# Patient Record
Sex: FEMALE | Race: WHITE | Employment: OTHER | URBAN - METROPOLITAN AREA
[De-identification: names, ages, dates, MRNs, and addresses within clinical notes are randomized per-mention and may not be internally consistent; named-entity substitution may affect disease eponyms.]

---

## 2024-04-28 ENCOUNTER — APPOINTMENT (OUTPATIENT)
Dept: CT IMAGING | Age: 74
DRG: 068 | End: 2024-04-28
Payer: MEDICARE

## 2024-04-28 ENCOUNTER — HOSPITAL ENCOUNTER (INPATIENT)
Age: 74
LOS: 3 days | Discharge: NURSING, REHAB OR LONG-TERM CARE | DRG: 068 | End: 2024-05-01
Attending: EMERGENCY MEDICINE | Admitting: STUDENT IN AN ORGANIZED HEALTH CARE EDUCATION/TRAINING PROGRAM
Payer: MEDICARE

## 2024-04-28 ENCOUNTER — APPOINTMENT (OUTPATIENT)
Dept: GENERAL RADIOLOGY | Age: 74
DRG: 068 | End: 2024-04-28
Payer: MEDICARE

## 2024-04-28 DIAGNOSIS — I63.9 CEREBROVASCULAR ACCIDENT (CVA), UNSPECIFIED MECHANISM (HCC): ICD-10-CM

## 2024-04-28 DIAGNOSIS — R29.90 STROKE-LIKE EPISODE: Primary | ICD-10-CM

## 2024-04-28 PROBLEM — I65.03 STENOSIS OF BOTH VERTEBRAL ARTERIES: Status: ACTIVE | Noted: 2024-04-28

## 2024-04-28 PROBLEM — R26.9 GAIT ABNORMALITY: Status: ACTIVE | Noted: 2024-04-28

## 2024-04-28 PROBLEM — I10 PRIMARY HYPERTENSION: Status: ACTIVE | Noted: 2024-04-28

## 2024-04-28 LAB
ANION GAP SERPL CALCULATED.3IONS-SCNC: 14 MMOL/L (ref 9–17)
BASOPHILS # BLD: 0.1 K/UL (ref 0–0.2)
BASOPHILS NFR BLD: 1 % (ref 0–2)
BUN SERPL-MCNC: 18 MG/DL (ref 8–23)
CALCIUM SERPL-MCNC: 9.5 MG/DL (ref 8.6–10.4)
CHLORIDE SERPL-SCNC: 107 MMOL/L (ref 98–107)
CO2 SERPL-SCNC: 21 MMOL/L (ref 20–31)
CREAT SERPL-MCNC: 0.9 MG/DL (ref 0.5–0.9)
EOSINOPHIL # BLD: 0.1 K/UL (ref 0–0.4)
EOSINOPHILS RELATIVE PERCENT: 1 % (ref 1–4)
ERYTHROCYTE [DISTWIDTH] IN BLOOD BY AUTOMATED COUNT: 13.4 % (ref 12.5–15.4)
GFR SERPL CREATININE-BSD FRML MDRD: 68 ML/MIN/1.73M2
GLUCOSE SERPL-MCNC: 103 MG/DL (ref 70–99)
HCT VFR BLD AUTO: 50 % (ref 36–46)
HGB BLD-MCNC: 17.1 G/DL (ref 12–16)
INR PPP: 1
LYMPHOCYTES NFR BLD: 1.3 K/UL (ref 1–4.8)
LYMPHOCYTES RELATIVE PERCENT: 17 % (ref 24–44)
MAGNESIUM SERPL-MCNC: 2.3 MG/DL (ref 1.6–2.6)
MCH RBC QN AUTO: 32 PG (ref 26–34)
MCHC RBC AUTO-ENTMCNC: 34.2 G/DL (ref 31–37)
MCV RBC AUTO: 93.6 FL (ref 80–100)
MONOCYTES NFR BLD: 0.7 K/UL (ref 0.1–1.2)
MONOCYTES NFR BLD: 10 % (ref 2–11)
NEUTROPHILS NFR BLD: 71 % (ref 36–66)
NEUTS SEG NFR BLD: 5.2 K/UL (ref 1.8–7.7)
PARTIAL THROMBOPLASTIN TIME: 24.3 SEC (ref 21.3–31.3)
PLATELET # BLD AUTO: 89 K/UL (ref 140–450)
PMV BLD AUTO: 7.1 FL (ref 6–12)
POTASSIUM SERPL-SCNC: 3.8 MMOL/L (ref 3.7–5.3)
PROTHROMBIN TIME: 11 SEC (ref 9.4–12.6)
RBC # BLD AUTO: 5.34 M/UL (ref 4–5.2)
SODIUM SERPL-SCNC: 142 MMOL/L (ref 135–144)
TROPONIN I SERPL HS-MCNC: 15 NG/L (ref 0–14)
TROPONIN I SERPL HS-MCNC: 16 NG/L (ref 0–14)
WBC OTHER # BLD: 7.3 K/UL (ref 3.5–11)

## 2024-04-28 PROCEDURE — 6370000000 HC RX 637 (ALT 250 FOR IP): Performed by: STUDENT IN AN ORGANIZED HEALTH CARE EDUCATION/TRAINING PROGRAM

## 2024-04-28 PROCEDURE — 99222 1ST HOSP IP/OBS MODERATE 55: CPT | Performed by: STUDENT IN AN ORGANIZED HEALTH CARE EDUCATION/TRAINING PROGRAM

## 2024-04-28 PROCEDURE — 6360000004 HC RX CONTRAST MEDICATION: Performed by: EMERGENCY MEDICINE

## 2024-04-28 PROCEDURE — 93005 ELECTROCARDIOGRAM TRACING: CPT | Performed by: STUDENT IN AN ORGANIZED HEALTH CARE EDUCATION/TRAINING PROGRAM

## 2024-04-28 PROCEDURE — 85730 THROMBOPLASTIN TIME PARTIAL: CPT

## 2024-04-28 PROCEDURE — 99284 EMERGENCY DEPT VISIT MOD MDM: CPT | Performed by: PSYCHIATRY & NEUROLOGY

## 2024-04-28 PROCEDURE — 36415 COLL VENOUS BLD VENIPUNCTURE: CPT

## 2024-04-28 PROCEDURE — 83735 ASSAY OF MAGNESIUM: CPT

## 2024-04-28 PROCEDURE — 83036 HEMOGLOBIN GLYCOSYLATED A1C: CPT

## 2024-04-28 PROCEDURE — 71045 X-RAY EXAM CHEST 1 VIEW: CPT

## 2024-04-28 PROCEDURE — 2580000003 HC RX 258: Performed by: EMERGENCY MEDICINE

## 2024-04-28 PROCEDURE — 85025 COMPLETE CBC W/AUTO DIFF WBC: CPT

## 2024-04-28 PROCEDURE — 70498 CT ANGIOGRAPHY NECK: CPT

## 2024-04-28 PROCEDURE — 6370000000 HC RX 637 (ALT 250 FOR IP): Performed by: EMERGENCY MEDICINE

## 2024-04-28 PROCEDURE — 84484 ASSAY OF TROPONIN QUANT: CPT

## 2024-04-28 PROCEDURE — 93005 ELECTROCARDIOGRAM TRACING: CPT | Performed by: EMERGENCY MEDICINE

## 2024-04-28 PROCEDURE — 80048 BASIC METABOLIC PNL TOTAL CA: CPT

## 2024-04-28 PROCEDURE — 70450 CT HEAD/BRAIN W/O DYE: CPT

## 2024-04-28 PROCEDURE — 85610 PROTHROMBIN TIME: CPT

## 2024-04-28 PROCEDURE — 99285 EMERGENCY DEPT VISIT HI MDM: CPT

## 2024-04-28 PROCEDURE — 4A03X5D MEASUREMENT OF ARTERIAL FLOW, INTRACRANIAL, EXTERNAL APPROACH: ICD-10-PCS | Performed by: RADIOLOGY

## 2024-04-28 PROCEDURE — 2060000000 HC ICU INTERMEDIATE R&B

## 2024-04-28 RX ORDER — CLOPIDOGREL 300 MG/1
300 TABLET, FILM COATED ORAL ONCE
Status: COMPLETED | OUTPATIENT
Start: 2024-04-28 | End: 2024-04-28

## 2024-04-28 RX ORDER — CLOPIDOGREL BISULFATE 75 MG/1
75 TABLET ORAL DAILY
Status: DISCONTINUED | OUTPATIENT
Start: 2024-04-29 | End: 2024-05-01 | Stop reason: HOSPADM

## 2024-04-28 RX ORDER — ONDANSETRON 4 MG/1
4 TABLET, ORALLY DISINTEGRATING ORAL EVERY 8 HOURS PRN
Status: DISCONTINUED | OUTPATIENT
Start: 2024-04-28 | End: 2024-05-01 | Stop reason: HOSPADM

## 2024-04-28 RX ORDER — LOSARTAN POTASSIUM 25 MG/1
25 TABLET ORAL DAILY
Status: DISCONTINUED | OUTPATIENT
Start: 2024-04-29 | End: 2024-05-01 | Stop reason: HOSPADM

## 2024-04-28 RX ORDER — POLYETHYLENE GLYCOL 3350 17 G/17G
17 POWDER, FOR SOLUTION ORAL DAILY PRN
Status: DISCONTINUED | OUTPATIENT
Start: 2024-04-28 | End: 2024-05-01 | Stop reason: HOSPADM

## 2024-04-28 RX ORDER — ACETAMINOPHEN 650 MG/1
650 SUPPOSITORY RECTAL EVERY 6 HOURS PRN
Status: DISCONTINUED | OUTPATIENT
Start: 2024-04-28 | End: 2024-05-01 | Stop reason: HOSPADM

## 2024-04-28 RX ORDER — POTASSIUM CHLORIDE 7.45 MG/ML
10 INJECTION INTRAVENOUS PRN
Status: DISCONTINUED | OUTPATIENT
Start: 2024-04-28 | End: 2024-05-01 | Stop reason: HOSPADM

## 2024-04-28 RX ORDER — CLOPIDOGREL BISULFATE 75 MG/1
75 TABLET ORAL ONCE
Status: DISCONTINUED | OUTPATIENT
Start: 2024-04-29 | End: 2024-04-29

## 2024-04-28 RX ORDER — ASPIRIN 81 MG/1
81 TABLET, CHEWABLE ORAL DAILY
Status: DISCONTINUED | OUTPATIENT
Start: 2024-04-29 | End: 2024-05-01 | Stop reason: HOSPADM

## 2024-04-28 RX ORDER — ATORVASTATIN CALCIUM 40 MG/1
40 TABLET, FILM COATED ORAL NIGHTLY
Status: DISCONTINUED | OUTPATIENT
Start: 2024-04-28 | End: 2024-05-01 | Stop reason: HOSPADM

## 2024-04-28 RX ORDER — MAGNESIUM SULFATE IN WATER 40 MG/ML
2000 INJECTION, SOLUTION INTRAVENOUS PRN
Status: DISCONTINUED | OUTPATIENT
Start: 2024-04-28 | End: 2024-05-01 | Stop reason: HOSPADM

## 2024-04-28 RX ORDER — SODIUM CHLORIDE 9 MG/ML
INJECTION, SOLUTION INTRAVENOUS PRN
Status: DISCONTINUED | OUTPATIENT
Start: 2024-04-28 | End: 2024-05-01 | Stop reason: HOSPADM

## 2024-04-28 RX ORDER — SODIUM CHLORIDE 0.9 % (FLUSH) 0.9 %
5-40 SYRINGE (ML) INJECTION EVERY 12 HOURS SCHEDULED
Status: DISCONTINUED | OUTPATIENT
Start: 2024-04-28 | End: 2024-05-01 | Stop reason: HOSPADM

## 2024-04-28 RX ORDER — ASPIRIN 81 MG/1
324 TABLET, CHEWABLE ORAL ONCE
Status: COMPLETED | OUTPATIENT
Start: 2024-04-28 | End: 2024-04-28

## 2024-04-28 RX ORDER — ACETAMINOPHEN 325 MG/1
650 TABLET ORAL EVERY 6 HOURS PRN
Status: DISCONTINUED | OUTPATIENT
Start: 2024-04-28 | End: 2024-05-01 | Stop reason: HOSPADM

## 2024-04-28 RX ORDER — 0.9 % SODIUM CHLORIDE 0.9 %
80 INTRAVENOUS SOLUTION INTRAVENOUS ONCE
Status: DISCONTINUED | OUTPATIENT
Start: 2024-04-28 | End: 2024-05-01 | Stop reason: HOSPADM

## 2024-04-28 RX ORDER — SODIUM CHLORIDE 0.9 % (FLUSH) 0.9 %
5-40 SYRINGE (ML) INJECTION PRN
Status: DISCONTINUED | OUTPATIENT
Start: 2024-04-28 | End: 2024-05-01 | Stop reason: HOSPADM

## 2024-04-28 RX ORDER — ENOXAPARIN SODIUM 100 MG/ML
40 INJECTION SUBCUTANEOUS DAILY
Status: DISCONTINUED | OUTPATIENT
Start: 2024-04-29 | End: 2024-05-01 | Stop reason: HOSPADM

## 2024-04-28 RX ORDER — ONDANSETRON 2 MG/ML
4 INJECTION INTRAMUSCULAR; INTRAVENOUS EVERY 6 HOURS PRN
Status: DISCONTINUED | OUTPATIENT
Start: 2024-04-28 | End: 2024-05-01 | Stop reason: HOSPADM

## 2024-04-28 RX ORDER — POTASSIUM CHLORIDE 20 MEQ/1
40 TABLET, EXTENDED RELEASE ORAL PRN
Status: DISCONTINUED | OUTPATIENT
Start: 2024-04-28 | End: 2024-05-01 | Stop reason: HOSPADM

## 2024-04-28 RX ADMIN — ASPIRIN 324 MG: 81 TABLET, CHEWABLE ORAL at 15:03

## 2024-04-28 RX ADMIN — SODIUM CHLORIDE, PRESERVATIVE FREE 10 ML: 5 INJECTION INTRAVENOUS at 13:14

## 2024-04-28 RX ADMIN — IOPAMIDOL 75 ML: 755 INJECTION, SOLUTION INTRAVENOUS at 13:12

## 2024-04-28 RX ADMIN — Medication 80 ML: at 13:15

## 2024-04-28 RX ADMIN — CLOPIDOGREL BISULFATE 300 MG: 300 TABLET, FILM COATED ORAL at 15:02

## 2024-04-28 RX ADMIN — ATORVASTATIN CALCIUM 40 MG: 40 TABLET, FILM COATED ORAL at 21:45

## 2024-04-28 ASSESSMENT — PAIN - FUNCTIONAL ASSESSMENT
PAIN_FUNCTIONAL_ASSESSMENT: NONE - DENIES PAIN
PAIN_FUNCTIONAL_ASSESSMENT: 0-10

## 2024-04-28 ASSESSMENT — PAIN SCALES - GENERAL: PAINLEVEL_OUTOF10: 0

## 2024-04-28 NOTE — H&P
appearing. CT and CTA of the head and neck showed several old strokes as well as vertebral artery stenosis but were negative for acute CVA. The patient is admitted to internal medicine for further management of likely subacute CVA. Neurology has been consulted; appreciate recommendations.     Past Medical History:     Past Medical History:   Diagnosis Date    CAD (coronary artery disease)     Hypertension         Past Surgical History:     Past Surgical History:   Procedure Laterality Date    CORONARY STENT PLACEMENT      PACEMAKER PLACEMENT          Medications Prior to Admission:     Prior to Admission medications    Not on File        Allergies:     Patient has no known allergies.    Physical Exam:   BP (!) 157/99   Pulse 75   Temp 97.7 °F (36.5 °C) (Oral)   Resp 18   Wt 86.7 kg (191 lb 3.2 oz)   Temp (24hrs), Av.7 °F (36.5 °C), Min:97.7 °F (36.5 °C), Max:97.7 °F (36.5 °C)    No results for input(s): \"POCGLU\" in the last 72 hours.  No intake or output data in the 24 hours ending 24 1643    General Appearance:  alert, well appearing, and in no acute distress  Mental status: oriented to person, place, and time  Head:  normocephalic, atraumatic  Eye: no icterus, redness, pupils equal and reactive, extraocular eye movements intact, conjunctiva clear  Ear: normal external ear, no discharge, hearing intact  Nose:  no drainage noted  Mouth: mucous membranes moist  Neck: supple, no carotid bruits, thyroid not palpable  Lungs: Bilateral equal air entry, clear to ausculation, no wheezing, rales or rhonchi, normal effort  Cardiovascular: normal rate, regular rhythm, no murmur, gallop, rub  Abdomen: Soft, nontender, nondistended, normal bowel sounds, no hepatomegaly or splenomegaly  Neurologic: Speech is slurred. No cranial nerve deficits appreciated.   Skin: No gross lesions, rashes, bruising or bleeding on exposed skin area  Extremities:  peripheral pulses palpable, no pedal edema or calf pain with     P Axis 23 degrees    R Axis -5 degrees    T Axis 141 degrees   Troponin    Collection Time: 04/28/24  3:58 PM   Result Value Ref Range    Troponin, High Sensitivity 15 (H) 0 - 14 ng/L       Imaging/Diagnostics:  CT HEAD WO CONTRAST    Result Date: 4/28/2024  1. No acute intracranial abnormality. 2. Small old infarctions in the left cerebellar hemisphere and in the right occipital lobe.  Old lacunar infarct in the damian. 3. Right dominance of the vertebral arteries with hypoplastic left vertebral artery.  Superimposed occlusion in the V1 and V2 segments of the left vertebral artery. 4. 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 5. Moderate stenosis in the proximal P2 segment of the left PCA. 6. Moderate to severe stenosis in the supraclinoid segment of the right internal carotid artery. 7. 70% stenosis in the cavernous/supraclinoid segment of the left internal carotid artery. 8. 50% stenosis at the origin of the left common carotid artery. 9. Up to 40% stenosis in the proximal bilateral internal carotid arteries.     CTA HEAD NECK W CONTRAST    Result Date: 4/28/2024  1. No acute intracranial abnormality. 2. Small old infarctions in the left cerebellar hemisphere and in the right occipital lobe.  Old lacunar infarct in the damian. 3. Right dominance of the vertebral arteries with hypoplastic left vertebral artery.  Superimposed occlusion in the V1 and V2 segments of the left vertebral artery. 4. 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 5. Moderate stenosis in the proximal P2 segment of the left PCA. 6. Moderate to severe stenosis in the supraclinoid segment of the right internal carotid artery. 7. 70% stenosis in the cavernous/supraclinoid segment of the left internal carotid artery. 8. 50% stenosis at the origin of the left common carotid artery. 9. Up to 40% stenosis in the proximal bilateral internal carotid arteries.     XR CHEST PORTABLE    Result Date: 4/28/2024  No radiographic evidence of an acute

## 2024-04-28 NOTE — ED NOTES
Ortiz Watson MD   Patient:  JOSEPH, GRACIA   YOB: 1950  MRN:  [id number]  Location: [facility]    [id number]  4/28/24 12:33 PM   [phone] From:  ed [facility] Emergency Department [place] RE: GRACIA JOSEPH per dr Miguel Ángel Dan

## 2024-04-28 NOTE — ED PROVIDER NOTES
[facility] Emergency Department  [address]  [place] [address]  Phone: [phone]  Fax: [phone]        Pt Name: Samantha Calero  MRN: [id number]  Birthdate 1950  Date of evaluation: 4/28/24      CHIEF COMPLAINT   No chief complaint on file.        HISTORY OF PRESENT ILLNESS    Samantha Calero is a 73 y.o. female who presents to our Emergency Department.    Patient presents emerged primary complaining of strokelike symptoms.  Patient states that she has had difficulty with slurred speech and difficulty with ambulation ongoing for the past 4 to 5 days.  States that it did not get better so today she decided to call EMS.  Patient states that she did not take any of her medications that she is prescribed and has not done so in a few years.  Patient states that she has some blurriness in her vision but no visual field deficits.  Patient states that she does not have any chest pain or shortness of breath.  No nausea no vomiting.  No diarrhea no constipation.  She states that she feels like she feels unstable.  No diplopia or dysphagia.          REVIEW OF SYSTEMS       Review of Systems   Constitutional:  Negative for chills, diaphoresis and fever.   HENT:  Negative for drooling.    Eyes:  Positive for visual disturbance. Negative for redness.   Respiratory:  Negative for cough, chest tightness and shortness of breath.    Cardiovascular:  Negative for chest pain and palpitations.   Gastrointestinal:  Negative for abdominal pain, constipation, diarrhea, nausea and vomiting.   Genitourinary:  Negative for dysuria and hematuria.   Musculoskeletal:  Negative for neck stiffness.   Skin:  Negative for rash.   Neurological:  Positive for dizziness and speech difficulty. Negative for weakness, numbness and headaches.   Psychiatric/Behavioral:  Negative for agitation.        PAST MEDICAL HISTORY     Past Medical History:   Diagnosis Date    CAD (coronary artery disease)        Comments: Muscle strength 5/5 bilaterally:  Strength, Elbow flexion/extension, shoulder flexion, hip flexion/extension, knee flexion/extension, ankle dorsi/plantarflexion Finger to nose and heel to shin intact bilaterally     Skin:     Capillary Refill: Capillary refill takes less than 2 seconds.   Neurological:      Mental Status: She is alert and oriented to person, place, and time.      Sensory: No sensory deficit.   Psychiatric:         Mood and Affect: Mood normal.         Behavior: Behavior normal.         DIAGNOSTIC RESULTS     EKG: All EKG's are interpreted by the Emergency Department Physician who either signs or Co-signs this chart in the absence of a cardiologist.  Please see dictation of EKG in ED Course    RADIOLOGY:  The following imaging tests were ordered, reviewed by me, and interpreted by Radiology    CT HEAD WO CONTRAST    Result Date: 4/28/2024  EXAMINATION: CTA OF THE HEAD AND NECK WITH CONTRAST; CT OF THE HEAD WITHOUT CONTRAST 4/28/2024 1:11 pm; 4/28/2024 1:16 pm: TECHNIQUE: CTA of the head and neck was performed with the administration of intravenous contrast. Multiplanar reformatted images are provided for review.  MIP images are provided for review. Stenosis of the internal carotid arteries measured using NASCET criteria. Automated exposure control, iterative reconstruction, and/or weight based adjustment of the mA/kV was utilized to reduce the radiation dose to as low as reasonably achievable.; CT of the head was performed without the administration of intravenous contrast. Automated exposure control, iterative reconstruction, and/or weight based adjustment of the mA/kV was utilized to reduce the radiation dose to as low as reasonably achievable. Noncontrast CT of the head with reconstructed 2-D images are also provided for review. COMPARISON: None. HISTORY: ORDERING SYSTEM PROVIDED HISTORY: possible stroke TECHNOLOGIST PROVIDED HISTORY: possible stroke Decision Support Exception -

## 2024-04-28 NOTE — VIRTUAL HEALTH
[facility] Stroke and Telestroke Consult for  [place] ED Stroke Alert through [employer] @ 14:45  4/28/2024 3:33 PM    Pt Name: Samantha Calero  MRN: [id number]  YOB: 1950  Date of evaluation: 4/28/2024  Primary Care Physician: Stefanie Rollins MD  Reason for Evaluation: Stroke Evaluation with Discussion with Ed or primary team with Telemedicine and stroke evaluation with Review of imaging and labs    Samantha Calero is a 73 y.o. female who presents with history of cad, pacemaker and htn with symptoms for the past 5 days for dysarthria and gait imbalance.  No improvement over the past five days. Not on antiplatelets for the past year. Out of window for iv tnk    Ct and cta concerning for chronic left cerebellar and right occipital and pontine stroke.  bilateral functionally fetal pca and noted diminutive basilar artery with stenosis.  Also noted right distal ica stenosis and left cavernous 70% stenosis.      LKW: 5 days  NIH:  3    Allergies  has No Known Allergies.  Medications  Prior to Admission medications    Not on File    Scheduled Meds:   sodium chloride  80 mL IntraVENous Once    [START ON 4/29/2024] clopidogrel  75 mg Oral Once     Continuous Infusions:  PRN Meds:.sodium chloride flush  Past Medical History   has a past medical history of CAD (coronary artery disease) and Hypertension.  Social History  Social History     Socioeconomic History    Marital status: Single     Spouse name: Not on file    Number of children: Not on file    Years of education: Not on file    Highest education level: Not on file   Occupational History    Not on file   Tobacco Use    Smoking status: Every Day     Types: Cigarettes    Smokeless tobacco: Never   Substance and Sexual Activity    Alcohol use: Not Currently    Drug use: Never    Sexual activity: Not on file   Other Topics Concern    Not on file   Social History Narrative    Not on file     Social Determinants of Health     Financial

## 2024-04-28 NOTE — ED TRIAGE NOTES
Chief Complaint   Patient presents with     RECHECK     hospital follow up       There were no vitals filed for this visit.    There is no height or weight on file to calculate BMI.          JUSTIN MORE EMT     PT reports unsteady gait that started approx 4-5 days ago.  Called EMS today & noticed her speech was slurred

## 2024-04-29 ENCOUNTER — APPOINTMENT (OUTPATIENT)
Age: 74
DRG: 068 | End: 2024-04-29
Attending: STUDENT IN AN ORGANIZED HEALTH CARE EDUCATION/TRAINING PROGRAM
Payer: MEDICARE

## 2024-04-29 LAB
ANION GAP SERPL CALCULATED.3IONS-SCNC: 14 MMOL/L (ref 9–17)
BASOPHILS # BLD: 0.1 K/UL (ref 0–0.2)
BASOPHILS NFR BLD: 1 % (ref 0–2)
BUN SERPL-MCNC: 17 MG/DL (ref 8–23)
CALCIUM SERPL-MCNC: 9.6 MG/DL (ref 8.6–10.4)
CHLORIDE SERPL-SCNC: 104 MMOL/L (ref 98–107)
CO2 SERPL-SCNC: 21 MMOL/L (ref 20–31)
CREAT SERPL-MCNC: 0.8 MG/DL (ref 0.5–0.9)
ECHO AO ROOT DIAM: 3 CM
ECHO AO ROOT INDEX: 1.58 CM/M2
ECHO AV AREA PEAK VELOCITY: 2 CM2
ECHO AV AREA VTI: 2.1 CM2
ECHO AV AREA/BSA PEAK VELOCITY: 1.1 CM2/M2
ECHO AV AREA/BSA VTI: 1.1 CM2/M2
ECHO AV MEAN GRADIENT: 5 MMHG
ECHO AV MEAN VELOCITY: 1 M/S
ECHO AV PEAK GRADIENT: 9 MMHG
ECHO AV PEAK VELOCITY: 1.5 M/S
ECHO AV VELOCITY RATIO: 0.8
ECHO AV VTI: 25 CM
ECHO BSA: 1.95 M2
ECHO EST RA PRESSURE: 3 MMHG
ECHO IVC EXP: 1.9 CM
ECHO LA AREA 2C: 18.3 CM2
ECHO LA AREA 4C: 16.3 CM2
ECHO LA DIAMETER INDEX: 2 CM/M2
ECHO LA DIAMETER: 3.8 CM
ECHO LA MAJOR AXIS: 5.4 CM
ECHO LA MINOR AXIS: 5.5 CM
ECHO LA TO AORTIC ROOT RATIO: 1.27
ECHO LA VOL BP: 45 ML (ref 22–52)
ECHO LA VOL MOD A2C: 50 ML (ref 22–52)
ECHO LA VOL MOD A4C: 40 ML (ref 22–52)
ECHO LA VOL/BSA BIPLANE: 24 ML/M2 (ref 16–34)
ECHO LA VOLUME INDEX MOD A2C: 26 ML/M2 (ref 16–34)
ECHO LA VOLUME INDEX MOD A4C: 21 ML/M2 (ref 16–34)
ECHO LV E' LATERAL VELOCITY: 4 CM/S
ECHO LV E' SEPTAL VELOCITY: 3 CM/S
ECHO LV EDV A2C: 61 ML
ECHO LV EDV A4C: 84 ML
ECHO LV EDV INDEX A4C: 44 ML/M2
ECHO LV EDV NDEX A2C: 32 ML/M2
ECHO LV EJECTION FRACTION A2C: 26 %
ECHO LV EJECTION FRACTION A4C: 49 %
ECHO LV EJECTION FRACTION BIPLANE: 41 % (ref 55–100)
ECHO LV ESV A2C: 45 ML
ECHO LV ESV A4C: 43 ML
ECHO LV ESV INDEX A2C: 24 ML/M2
ECHO LV ESV INDEX A4C: 23 ML/M2
ECHO LV FRACTIONAL SHORTENING: 13 % (ref 28–44)
ECHO LV INTERNAL DIMENSION DIASTOLE INDEX: 2.37 CM/M2
ECHO LV INTERNAL DIMENSION DIASTOLIC: 4.5 CM (ref 3.9–5.3)
ECHO LV INTERNAL DIMENSION SYSTOLIC INDEX: 2.05 CM/M2
ECHO LV INTERNAL DIMENSION SYSTOLIC: 3.9 CM
ECHO LV IVSD: 1.6 CM (ref 0.6–0.9)
ECHO LV MASS 2D: 304.6 G (ref 67–162)
ECHO LV MASS INDEX 2D: 160.3 G/M2 (ref 43–95)
ECHO LV POSTERIOR WALL DIASTOLIC: 1.6 CM (ref 0.6–0.9)
ECHO LV RELATIVE WALL THICKNESS RATIO: 0.71
ECHO LVOT AREA: 2.5 CM2
ECHO LVOT AV VTI INDEX: 0.84
ECHO LVOT DIAM: 1.8 CM
ECHO LVOT MEAN GRADIENT: 3 MMHG
ECHO LVOT PEAK GRADIENT: 6 MMHG
ECHO LVOT PEAK VELOCITY: 1.2 M/S
ECHO LVOT STROKE VOLUME INDEX: 28 ML/M2
ECHO LVOT SV: 53.2 ML
ECHO LVOT VTI: 20.9 CM
ECHO MV A VELOCITY: 1.11 M/S
ECHO MV E DECELERATION TIME (DT): 173 MS
ECHO MV E VELOCITY: 0.56 M/S
ECHO MV E/A RATIO: 0.5
ECHO MV E/E' LATERAL: 14
ECHO MV E/E' RATIO (AVERAGED): 16.33
ECHO RIGHT VENTRICULAR SYSTOLIC PRESSURE (RVSP): 31 MMHG
ECHO RV TAPSE: 1.6 CM (ref 1.7–?)
ECHO TV REGURGITANT MAX VELOCITY: 2.64 M/S
ECHO TV REGURGITANT PEAK GRADIENT: 28 MMHG
EKG ATRIAL RATE: 71 BPM
EKG ATRIAL RATE: 76 BPM
EKG P AXIS: 23 DEGREES
EKG P-R INTERVAL: 178 MS
EKG P-R INTERVAL: 184 MS
EKG Q-T INTERVAL: 420 MS
EKG Q-T INTERVAL: 424 MS
EKG QRS DURATION: 100 MS
EKG QRS DURATION: 94 MS
EKG QTC CALCULATION (BAZETT): 460 MS
EKG QTC CALCULATION (BAZETT): 472 MS
EKG R AXIS: -173 DEGREES
EKG R AXIS: -5 DEGREES
EKG T AXIS: 141 DEGREES
EKG T AXIS: 68 DEGREES
EKG VENTRICULAR RATE: 71 BPM
EKG VENTRICULAR RATE: 76 BPM
EOSINOPHIL # BLD: 0.1 K/UL (ref 0–0.4)
EOSINOPHILS RELATIVE PERCENT: 1 % (ref 1–4)
ERYTHROCYTE [DISTWIDTH] IN BLOOD BY AUTOMATED COUNT: 13.2 % (ref 12.5–15.4)
EST. AVERAGE GLUCOSE BLD GHB EST-MCNC: 114 MG/DL
GFR SERPL CREATININE-BSD FRML MDRD: 79 ML/MIN/1.73M2
GLUCOSE SERPL-MCNC: 80 MG/DL (ref 70–99)
HBA1C MFR BLD: 5.6 % (ref 4–6)
HCT VFR BLD AUTO: 50.4 % (ref 36–46)
HGB BLD-MCNC: 17.3 G/DL (ref 12–16)
LYMPHOCYTES NFR BLD: 0.9 K/UL (ref 1–4.8)
LYMPHOCYTES RELATIVE PERCENT: 12 % (ref 24–44)
MCH RBC QN AUTO: 32.1 PG (ref 26–34)
MCHC RBC AUTO-ENTMCNC: 34.4 G/DL (ref 31–37)
MCV RBC AUTO: 93.4 FL (ref 80–100)
MONOCYTES NFR BLD: 0.7 K/UL (ref 0.1–1.2)
MONOCYTES NFR BLD: 9 % (ref 2–11)
NEUTROPHILS NFR BLD: 77 % (ref 36–66)
NEUTS SEG NFR BLD: 5.7 K/UL (ref 1.8–7.7)
PLATELET # BLD AUTO: 75 K/UL (ref 140–450)
PMV BLD AUTO: 7.4 FL (ref 6–12)
POTASSIUM SERPL-SCNC: 3.6 MMOL/L (ref 3.7–5.3)
RBC # BLD AUTO: 5.4 M/UL (ref 4–5.2)
SODIUM SERPL-SCNC: 139 MMOL/L (ref 135–144)
WBC OTHER # BLD: 7.3 K/UL (ref 3.5–11)

## 2024-04-29 PROCEDURE — 6360000002 HC RX W HCPCS: Performed by: STUDENT IN AN ORGANIZED HEALTH CARE EDUCATION/TRAINING PROGRAM

## 2024-04-29 PROCEDURE — 99223 1ST HOSP IP/OBS HIGH 75: CPT | Performed by: INTERNAL MEDICINE

## 2024-04-29 PROCEDURE — 99222 1ST HOSP IP/OBS MODERATE 55: CPT | Performed by: PSYCHIATRY & NEUROLOGY

## 2024-04-29 PROCEDURE — 97162 PT EVAL MOD COMPLEX 30 MIN: CPT

## 2024-04-29 PROCEDURE — 2060000000 HC ICU INTERMEDIATE R&B

## 2024-04-29 PROCEDURE — 97166 OT EVAL MOD COMPLEX 45 MIN: CPT

## 2024-04-29 PROCEDURE — 97535 SELF CARE MNGMENT TRAINING: CPT

## 2024-04-29 PROCEDURE — 36415 COLL VENOUS BLD VENIPUNCTURE: CPT

## 2024-04-29 PROCEDURE — 6370000000 HC RX 637 (ALT 250 FOR IP): Performed by: STUDENT IN AN ORGANIZED HEALTH CARE EDUCATION/TRAINING PROGRAM

## 2024-04-29 PROCEDURE — 80048 BASIC METABOLIC PNL TOTAL CA: CPT

## 2024-04-29 PROCEDURE — 99232 SBSQ HOSP IP/OBS MODERATE 35: CPT | Performed by: STUDENT IN AN ORGANIZED HEALTH CARE EDUCATION/TRAINING PROGRAM

## 2024-04-29 PROCEDURE — 93306 TTE W/DOPPLER COMPLETE: CPT | Performed by: INTERNAL MEDICINE

## 2024-04-29 PROCEDURE — 85025 COMPLETE CBC W/AUTO DIFF WBC: CPT

## 2024-04-29 PROCEDURE — 2580000003 HC RX 258: Performed by: STUDENT IN AN ORGANIZED HEALTH CARE EDUCATION/TRAINING PROGRAM

## 2024-04-29 PROCEDURE — 93306 TTE W/DOPPLER COMPLETE: CPT

## 2024-04-29 PROCEDURE — 97116 GAIT TRAINING THERAPY: CPT

## 2024-04-29 RX ADMIN — ATORVASTATIN CALCIUM 40 MG: 40 TABLET, FILM COATED ORAL at 21:01

## 2024-04-29 RX ADMIN — LOSARTAN POTASSIUM 25 MG: 25 TABLET, FILM COATED ORAL at 09:02

## 2024-04-29 RX ADMIN — SODIUM CHLORIDE, PRESERVATIVE FREE 10 ML: 5 INJECTION INTRAVENOUS at 09:03

## 2024-04-29 RX ADMIN — ASPIRIN 81 MG: 81 TABLET, CHEWABLE ORAL at 09:02

## 2024-04-29 RX ADMIN — ENOXAPARIN SODIUM 40 MG: 100 INJECTION SUBCUTANEOUS at 09:02

## 2024-04-29 RX ADMIN — SODIUM CHLORIDE, PRESERVATIVE FREE 10 ML: 5 INJECTION INTRAVENOUS at 21:01

## 2024-04-29 RX ADMIN — CLOPIDOGREL BISULFATE 75 MG: 75 TABLET ORAL at 09:02

## 2024-04-29 NOTE — PROGRESS NOTES
Physical Therapy  Facility/Department: [address]  Physical Therapy Initial Assessment    Name: Samantha Calero  : 1950  MRN: [id number]  Date of Service: 2024    Per internal medicine:   \"Samantha Calero is a 73 y.o. female with a past medical history of CAD and HTN who presented to the emergency department on 2024 complaining of slurred speech, left-sided facial numbness and gait disturbance with falls. The patient states that her symptoms began five-days-ago and have remained about the same. The patient reports that the left side of her face in numb and says that she has been falling to the right. In the ED, the patient was afebrile and nontoxic appearing. CT and CTA of the head and neck showed several old strokes as well as vertebral artery stenosis but were negative for acute CVA. The patient is admitted to internal medicine for further management of likely subacute CVA. Neurology has been consulted; appreciate recommendations.\"       Discharge Recommendations:  Patient would benefit from continued therapy after discharge, Patient able to tolerate 3hrs of therapy a day   Further therapy recommended at discharge.The patient should be able to tolerate at least 3 hours of therapy per day over 5 days or 15 hours over 7 days.   This patient may benefit from a Physical Medicine and Rehab consult.     PT Equipment Recommendations  Equipment Needed: Yes  Mobility Devices: Walker  Walker: Rolling  Other: Continue to assess pending progress      Patient Diagnosis(es): The encounter diagnosis was Stroke-like episode.  Past Medical History:  has a past medical history of CAD (coronary artery disease) and Hypertension.  Past Surgical History:  has a past surgical history that includes Coronary stent placement and pacemaker placement.    Assessment   Body Structures, Functions, Activity Limitations Requiring Skilled Therapeutic Intervention: Decreased functional mobility ;Decreased strength;Decreased safe

## 2024-04-29 NOTE — CONSULTS
unanticipated weight loss. There's been No change in energy level, No change in activity level.     Eyes: No visual changes or diplopia. No scleral icterus.  ENT: No Headaches, hearing loss or vertigo. No mouth sores or sore throat.  Cardiovascular: As HPI  Respiratory: As HPI  Gastrointestinal: No abdominal pain, appetite loss, blood in stools. No change in bowel or bladder habits.  Genitourinary: No dysuria, trouble voiding, or hematuria.  Musculoskeletal:  No gait disturbance, No weakness or joint complaints.  Integumentary: No rash or pruritis.  Neurological: No headache, diplopia, change in muscle strength, numbness or tingling. No change in gait, balance, coordination, mood, affect, memory, mentation, behavior.  Psychiatric: No anxiety, or depression.  Endocrine: No temperature intolerance. No excessive thirst, fluid intake, or urination. No tremor.  Hematologic/Lymphatic: No abnormal bruising or bleeding, blood clots or swollen lymph nodes.  Allergic/Immunologic: No nasal congestion or hives.    PHYSICAL EXAM:    Physical Examination:    BP (!) 145/71   Pulse 87   Temp 98.1 °F (36.7 °C) (Oral)   Resp 16   Ht 1.626 m (5' 4\")   Wt 84.4 kg (186 lb 1.1 oz)   SpO2 94%   BMI 31.94 kg/m²    Constitutional and General Appearance: alert, cooperative, no distress and appears stated age  HEENT: PERRL, no cervical lymphadenopathy. No masses palpable. Normal oral mucosa  Respiratory:  Normal excursion and expansion without use of accessory muscles  Resp Auscultation: Good respiratory effort. No for increased work of breathing. On auscultation: clear  Cardiovascular:  Heart tones are crisp and normal. regular S1 and S2. Murmurs: none  Jugular venous pulsation Normal  Abdomen:  No masses or tenderness  Bowel sounds present  Extremities:   No Cyanosis or Clubbing   Lower extremity edema: none   Skin: Warm and dry  Neurological:  Alert and oriented.  Moves all extremities well  No abnormalities of mood, affect, memory,  mentation, or behavior are noted    DATA:    Diagnostics:      EKG:   Results for orders placed or performed during the hospital encounter of 04/28/24   EKG 12 Lead   Result Value Ref Range    Ventricular Rate 76 BPM    Atrial Rate 76 BPM    P-R Interval 184 ms    QRS Duration 100 ms    Q-T Interval 420 ms    QTc Calculation (Bazett) 472 ms    P Axis 23 degrees    R Axis -5 degrees    T Axis 141 degrees    Narrative    Sinus rhythm with Premature supraventricular complexes  Moderate voltage criteria for LVH, may be normal variant ( R in aVL , Glidden product )  Inferior infarct (cited on or before 28-APR-2024)  ST & T non specific   Abnormal ECG       Labs:     CBC:   Recent Labs     04/28/24  1222   WBC 7.3   HGB 17.1*   HCT 50.0*   PLT 89*     BMP:   Recent Labs     04/28/24  1222      K 3.8   CO2 21   BUN 18   CREATININE 0.9   LABGLOM 68   GLUCOSE 103*     BNP: No results for input(s): \"BNP\" in the last 72 hours.  PT/INR:   Recent Labs     04/28/24  1222   PROTIME 11.0   INR 1.0     APTT:  Recent Labs     04/28/24  1222   APTT 24.3     CARDIAC ENZYMES:  Recent Labs     04/28/24  1222 04/28/24  1558   TROPHS 16* 15*     FASTING LIPID PANEL:No results found for: \"HDL\", \"LDLDIRECT\", \"LDLCALC\", \"TRIG\"  LIVER PROFILE:No results for input(s): \"AST\", \"ALT\", \"LABALBU\" in the last 72 hours.      IMPRESSION:    Stroke like symptoms   Small old infarct in L Cerebellar/R occipital and old lacunar infarction, severe stenosis in mid basilar artery   Known CAD s/p PCI in past- seen by [facility]- details unclear  Ch Sys HF, HFrEF, s/p ICD (appears dual chamber on CXR)  Renal artery stenosis  HTN- bp better  DL    RECOMMENDATIONS:  AICD interrogation   Echo pending   On DAPT  Neuro eval planned  ? Need for JAYE :  ? Cardioembolic CVAs given CT findings. No need for loop given DC AICD in place  Will follow    Discussed with patient and nursing.    Thank you for allowing me to participate in the care of this patient,

## 2024-04-29 NOTE — CARE COORDINATION
Case Management Assessment  Initial Evaluation    Date/Time of Evaluation: 4/29/2024 10:58 AM  Assessment Completed by: Carisa Figueredo    If patient is discharged prior to next notation, then this note serves as note for discharge by case management.    Patient Name: Samantha Calero                   YOB: 1950  Diagnosis: Stroke-like episode [R29.90]                   Date / Time: 4/28/2024 12:18 PM    Patient Admission Status: Inpatient   Readmission Risk (Low < 19, Mod (19-27), High > 27): Readmission Risk Score: 16.7    Current PCP: Stefanie Rollins MD  PCP verified by CM? Yes    Chart Reviewed: Yes      History Provided by: Patient  Patient Orientation: Alert and Oriented    Patient Cognition: Alert    Hospitalization in the last 30 days (Readmission):  No    If yes, Readmission Assessment in CM Navigator will be completed.    Advance Directives:      Code Status: DNR-CCA   Patient's Primary Decision Maker is: Legal Next of Kin (brother)      Discharge Planning:    Patient lives with: Alone Type of Home: House  Primary Care Giver: Self  Patient Support Systems include: Family Members, Friends/Neighbors (brother)   Current Financial resources: Medicare  Current community resources: None  Current services prior to admission: None            Current DME:              Type of Home Care services:  None    ADLS  Prior functional level: Independent in ADLs/IADLs  Current functional level: Independent in ADLs/IADLs    PT AM-PAC: 12 /24  OT AM-PAC:   /24    Family can provide assistance at DC: No  Would you like Case Management to discuss the discharge plan with any other family members/significant others, and if so, who? Yes (brother)  Plans to Return to Present Housing: No  Other Identified Issues/Barriers to RETURNING to current housing: unsteady on feet  Potential Assistance needed at discharge:  (TBD, walker?)            Potential DME:    Patient expects to discharge to: Unknown (TBD)  Plan for  to utilize the QR Code to obtain additional detailed star ratings from www.Medicare.gov     offered to print and provide the detailed list:    []Accepted   [x]Declined

## 2024-04-29 NOTE — PROGRESS NOTES
[facility]  Office: [phone]  Antoine Powell DO, Herman Mujica DO, Alex Thompson DO, Trae Esposito DO, Karla Chavez MD, Fiorella Cabral MD, Joe Dalton MD, Trish Hsu MD,  Thor Diggs MD, Laith Campo MD, Mars Francisco MD,  Bello Torres DO, Mukesh Linda MD, Osman Gardner MD, Sreedhar Powell DO, Connie Romero MD,  Asif Batista DO, Tatyana Lam MD, Hazel Dorantes MD, Melly Hardwick MD, Chelle Guerrero MD,  Tristen Dyer MD, Duane Wright MD, Francis Thomas MD, Everardo Montgomery MD, Noel Elizondo MD, Lizzy Bustillos MD, Wayne Jaime DO, Bari Chadwick DO, Marychuy Mendoza MD,  Otis Garibay MD, Shirley Waterhouse, CNP,  Emiliana Wing CNP, Tae Conrad, CNP,  Linda Sinha, DNP, Lisbeth Jarrett, CNP, Sherita Recinos, CNP, Carlotta Melvin CNP, Nancy Daniels, CNP, Daniella Dooley, PA-C, Christie Browning PA-C, Lizbeth Giron, CNP, Arielle Bass, CNP, Michell Davila, CNP, Mary Calixto, CNP, Kallie Russo, CNP, Karmen Blackwell, CNS, Arabella Jamison, CNP, Daksha Hernandez CNP, Tracy Schwab, CNP         [facility]   IN-PATIENT SERVICE   [facility]    Progress Note    4/29/2024    8:01 AM    Name:   Samantha Calero  MRN:     [id number]     Acct:      [id number]   Room:   326/326-01   Day:  1  Admit Date:  4/28/2024 12:18 PM    PCP:   Stefanie Rollins MD  Code Status:  DNR-CCA    Subjective:     Patient was seen and examined at bedside this AM. She reports feeling \"about the same\" and continues to complain of slurred speech and dizziness. MRI of the brain and echocardiogram are pending. Neurology has been consulted; appreciate recommendations.     Medications:     Allergies:  No Known Allergies    Current Meds:   Scheduled Meds:    sodium chloride  80 mL IntraVENous Once    clopidogrel  75 mg Oral Once    sodium chloride flush  5-40 mL IntraVENous 2 times per day    enoxaparin  40 mg SubCUTAneous Daily    aspirin  81 mg Oral Daily    clopidogrel  75 mg Oral Daily  and time and normal affect  Lungs:  clear to auscultation bilaterally, normal effort  Heart:  regular rate and rhythm, no murmur  Abdomen:  soft, nontender, nondistended, normal bowel sounds, no masses, hepatomegaly, splenomegaly  Extremities:  no edema, redness, tenderness in the calves  Skin:  no gross lesions, rashes, induration    Assessment:     Hospital Problems             Last Modified POA    * (Principal) Stroke-like episode 4/28/2024 Yes    Gait abnormality 4/28/2024 Yes    Stenosis of both vertebral arteries 4/28/2024 Yes    Primary hypertension 4/28/2024 Yes       Plan:     Stroke-like symptom   -The patient states that she has had left-sided facial numbness and falls for the past five-days   -CT head showing multiple small old infarctions in the left cerebellar hemisphere and right occipital lobe   -CTA showing severe stenosis in the right basilar artery   -MRI brain pending   -Echocardiogram pending   -Continue aspirin, Plavix and atorvastatin   -Consult neurology; appreciate recommendations      Hypertension   -Start losartan 25 mg daily      Advanced care planning   -The patient states that she would desire aggressive care up until the point of a cardiac or respiratory arrest but would not wish to be resuscitated if her heart stopped and would not desire mechanical ventilation   -Code status has been changed to DNR-CCA with no intubation per patient request       Osman Gardner MD  4/29/2024  8:01 AM

## 2024-04-29 NOTE — CONSULTS
[facility] [place]  [address], [address]  [place] [address]  Ph: [phone] or [phone]  FAX: [phone]        Reason for consult: Acute onset of gait imbalance    I had the pleasure of seeing your patient in neurology consultation for her symptoms. As you would recall Samantha Calero is a 73 y.o. yo female admitted on 4/28/2024.  The patient was at her baseline until 5 days ago when she noticed having tingling on the left face, speech dysarthria and as she tried to get up, she noticed gait imbalance.  She would sway either way right more than left.  The patient has a previous history of left cerebellar, right occipital and pontine ischemic strokes but otherwise does not use a walker or cane.  She came in as a stroke alert.  CT head was negative for acute stroke.  CT angiogram showed left cavernous 70% ICA stenosis.  Outpatient, patient takes aspirin 81 mg a day sporadically  Past Medical History:   Diagnosis Date    CAD (coronary artery disease)     Hypertension      Past Surgical History:   Procedure Laterality Date    CORONARY STENT PLACEMENT      PACEMAKER PLACEMENT       Social History     Socioeconomic History    Marital status: Single     Spouse name: Not on file    Number of children: Not on file    Years of education: Not on file    Highest education level: Not on file   Occupational History    Not on file   Tobacco Use    Smoking status: Every Day     Types: Cigarettes    Smokeless tobacco: Never   Substance and Sexual Activity    Alcohol use: Not Currently    Drug use: Never    Sexual activity: Not on file   Other Topics Concern    Not on file   Social History Narrative    Not on file     Social Determinants of Health     Financial Resource Strain: Not on file   Food Insecurity: Not on file   Transportation Needs: Not on file   Physical Activity: Not on file   Stress: Not on file   Social Connections: Not on file   Intimate Partner Violence: Not on file   Housing  and sound a- like substitutions which may escape proofreading.  In such instances, actual meaning can be extrapolated by contextual derivation.

## 2024-04-30 LAB
ANION GAP SERPL CALCULATED.3IONS-SCNC: 9 MMOL/L (ref 9–17)
BASOPHILS # BLD: 0.1 K/UL (ref 0–0.2)
BASOPHILS NFR BLD: 2 % (ref 0–2)
BUN SERPL-MCNC: 27 MG/DL (ref 8–23)
CALCIUM SERPL-MCNC: 9.4 MG/DL (ref 8.6–10.4)
CHLORIDE SERPL-SCNC: 107 MMOL/L (ref 98–107)
CO2 SERPL-SCNC: 24 MMOL/L (ref 20–31)
CREAT SERPL-MCNC: 1.1 MG/DL (ref 0.5–0.9)
EOSINOPHIL # BLD: 0.1 K/UL (ref 0–0.4)
EOSINOPHILS RELATIVE PERCENT: 1 % (ref 1–4)
ERYTHROCYTE [DISTWIDTH] IN BLOOD BY AUTOMATED COUNT: 13.2 % (ref 12.5–15.4)
GFR SERPL CREATININE-BSD FRML MDRD: 54 ML/MIN/1.73M2
GLUCOSE SERPL-MCNC: 103 MG/DL (ref 70–99)
HCT VFR BLD AUTO: 46.3 % (ref 36–46)
HGB BLD-MCNC: 15.8 G/DL (ref 12–16)
LYMPHOCYTES NFR BLD: 1.1 K/UL (ref 1–4.8)
LYMPHOCYTES RELATIVE PERCENT: 16 % (ref 24–44)
MCH RBC QN AUTO: 32.3 PG (ref 26–34)
MCHC RBC AUTO-ENTMCNC: 34.2 G/DL (ref 31–37)
MCV RBC AUTO: 94.4 FL (ref 80–100)
MONOCYTES NFR BLD: 0.8 K/UL (ref 0.1–1.2)
MONOCYTES NFR BLD: 11 % (ref 2–11)
NEUTROPHILS NFR BLD: 70 % (ref 36–66)
NEUTS SEG NFR BLD: 5 K/UL (ref 1.8–7.7)
PLATELET # BLD AUTO: 88 K/UL (ref 140–450)
PMV BLD AUTO: 7.8 FL (ref 6–12)
POTASSIUM SERPL-SCNC: 3.9 MMOL/L (ref 3.7–5.3)
RBC # BLD AUTO: 4.91 M/UL (ref 4–5.2)
SODIUM SERPL-SCNC: 140 MMOL/L (ref 135–144)
WBC OTHER # BLD: 7.1 K/UL (ref 3.5–11)

## 2024-04-30 PROCEDURE — 36415 COLL VENOUS BLD VENIPUNCTURE: CPT

## 2024-04-30 PROCEDURE — 97535 SELF CARE MNGMENT TRAINING: CPT

## 2024-04-30 PROCEDURE — 99233 SBSQ HOSP IP/OBS HIGH 50: CPT | Performed by: NURSE PRACTITIONER

## 2024-04-30 PROCEDURE — 80048 BASIC METABOLIC PNL TOTAL CA: CPT

## 2024-04-30 PROCEDURE — 6370000000 HC RX 637 (ALT 250 FOR IP): Performed by: NURSE PRACTITIONER

## 2024-04-30 PROCEDURE — 85025 COMPLETE CBC W/AUTO DIFF WBC: CPT

## 2024-04-30 PROCEDURE — 99232 SBSQ HOSP IP/OBS MODERATE 35: CPT | Performed by: PSYCHIATRY & NEUROLOGY

## 2024-04-30 PROCEDURE — 97116 GAIT TRAINING THERAPY: CPT

## 2024-04-30 PROCEDURE — 2060000000 HC ICU INTERMEDIATE R&B

## 2024-04-30 PROCEDURE — 97530 THERAPEUTIC ACTIVITIES: CPT

## 2024-04-30 PROCEDURE — 6370000000 HC RX 637 (ALT 250 FOR IP): Performed by: STUDENT IN AN ORGANIZED HEALTH CARE EDUCATION/TRAINING PROGRAM

## 2024-04-30 PROCEDURE — 99232 SBSQ HOSP IP/OBS MODERATE 35: CPT | Performed by: STUDENT IN AN ORGANIZED HEALTH CARE EDUCATION/TRAINING PROGRAM

## 2024-04-30 PROCEDURE — 2580000003 HC RX 258: Performed by: STUDENT IN AN ORGANIZED HEALTH CARE EDUCATION/TRAINING PROGRAM

## 2024-04-30 PROCEDURE — 99223 1ST HOSP IP/OBS HIGH 75: CPT | Performed by: PHYSICAL MEDICINE & REHABILITATION

## 2024-04-30 PROCEDURE — 6360000002 HC RX W HCPCS: Performed by: STUDENT IN AN ORGANIZED HEALTH CARE EDUCATION/TRAINING PROGRAM

## 2024-04-30 RX ORDER — CARVEDILOL 3.12 MG/1
3.12 TABLET ORAL 2 TIMES DAILY WITH MEALS
Status: DISCONTINUED | OUTPATIENT
Start: 2024-04-30 | End: 2024-05-01 | Stop reason: HOSPADM

## 2024-04-30 RX ADMIN — POTASSIUM BICARBONATE 20 MEQ: 782 TABLET, EFFERVESCENT ORAL at 09:13

## 2024-04-30 RX ADMIN — SODIUM CHLORIDE, PRESERVATIVE FREE 10 ML: 5 INJECTION INTRAVENOUS at 08:27

## 2024-04-30 RX ADMIN — LOSARTAN POTASSIUM 25 MG: 25 TABLET, FILM COATED ORAL at 08:27

## 2024-04-30 RX ADMIN — ENOXAPARIN SODIUM 40 MG: 100 INJECTION SUBCUTANEOUS at 08:27

## 2024-04-30 RX ADMIN — SODIUM CHLORIDE, PRESERVATIVE FREE 10 ML: 5 INJECTION INTRAVENOUS at 20:41

## 2024-04-30 RX ADMIN — CLOPIDOGREL BISULFATE 75 MG: 75 TABLET ORAL at 08:27

## 2024-04-30 RX ADMIN — ATORVASTATIN CALCIUM 40 MG: 40 TABLET, FILM COATED ORAL at 20:41

## 2024-04-30 RX ADMIN — ASPIRIN 81 MG: 81 TABLET, CHEWABLE ORAL at 08:28

## 2024-04-30 RX ADMIN — CARVEDILOL 3.12 MG: 3.12 TABLET, FILM COATED ORAL at 16:32

## 2024-04-30 RX ADMIN — CARVEDILOL 3.12 MG: 3.12 TABLET, FILM COATED ORAL at 09:13

## 2024-04-30 NOTE — PROGRESS NOTES
[facility] CARE DEPARTMENT [facility]  PROGRESS NOTE    Room # 326/326-01   Name: Samantha Calero            Christianity: Atheist     Reason for visit: Advanced Directives Consult    I visited the patient.    Admit Date & Time: 4/28/2024 12:18 PM    Assessment:  Samantha Calero is a 73 y.o. female in the hospital because of stroke-like episode. Upon entering the room Writer observed Patient was sitting in bed. Patient shared that she met with someone from the rehab doctor to talk about the possibility of going to rehab after the hospital.    Patient was agreeable to completing her advance directives (please see ACP note).    Intervention:  I introduced myself and my title as .   Writer inquired how Pt was doing. Writer assisted Patient with completing her advance directives.    Outcome:  Patient received the original and a copy of her health care power of  form.    Plan:  Chaplains will remain available to offer spiritual and emotional support as needed.     04/30/24 1858   Encounter Summary   Encounter Overview/Reason Advance Care Planning   Service Provided For Patient   Referral/Consult From Family   Support System Family members   Last Encounter  04/30/24   Complexity of Encounter Moderate   Begin Time 1835   End Time  1900   Total Time Calculated 25 min   Advance Care Planning   Type ACP conversation;Completed AD/ACP document(s)   Assessment/Intervention/Outcome   Assessment Calm;Coping   Intervention Active listening;Discussed illness injury and it’s impact;Explored/Affirmed feelings, thoughts, concerns;Sustaining Presence/Ministry of presence   Outcome Coping;Expressed Gratitude   Plan and Referrals   Plan/Referrals No future visits requested       Electronically signed by Chaplain Brigitte, on 4/30/2024 at 7:00 PM.  [facility]  [facility]  [phone]

## 2024-04-30 NOTE — PROGRESS NOTES
Physical Therapy  Facility/Department: [address]  Physical Therapy Daily Progress Note    Name: Samantha Calero  : 1950  MRN: [id number]  Date of Service: 2024    Discharge Recommendations:  Patient would benefit from continued therapy after discharge, Patient able to tolerate 3hrs of therapy a day   PT Equipment Recommendations  Equipment Needed: Yes  Mobility Devices: Walker  Walker: Rolling  Other: Continue to assess pending progress      Patient Diagnosis(es): The primary encounter diagnosis was Stroke-like episode. A diagnosis of Cerebrovascular accident (CVA), unspecified mechanism (HCC) was also pertinent to this visit.  Past Medical History:  has a past medical history of CAD (coronary artery disease) and Hypertension.  Past Surgical History:  has a past surgical history that includes Coronary stent placement and pacemaker placement.    Assessment   Body Structures, Functions, Activity Limitations Requiring Skilled Therapeutic Intervention: Decreased functional mobility ;Decreased strength;Decreased safe awareness;Decreased cognition;Decreased endurance;Decreased balance;Decreased coordination;Decreased posture  Assessment: Pt with impaired mobility from her baseline requiring Min-CGA for ambulation with RW up to 30' and CGA for transfers; pt remains unsteady at times with increased trunk sway. Pt would be unsafe to return to her prior living arrangements and would benefit from high intensity rehab upon discharge to mazimize her safety and independence with mobility.  Therapy Prognosis: Good  Requires PT Follow-Up: Yes  Activity Tolerance  Activity Tolerance: Patient limited by endurance     Plan   Physical Therapy Plan  General Plan: 5-7 times per week  Current Treatment Recommendations: Strengthening, ROM, Balance training, Functional mobility training, Transfer training, Endurance training, Gait training, Stair training, Neuromuscular re-education, Home exercise program, Safety education &

## 2024-04-30 NOTE — PLAN OF CARE
Pt admitted for stroke like symptoms, scans positive for cva, pt not able to have mri due to lead from AICD, pt balance is off and has slight left facial droop, pt has some garbled speech at times, pt will be discharged to ARU, ef 45-50%, vss, bp improving, medications adjusted, other treatments continue     Problem: Safety - Adult  Goal: Free from fall injury  Outcome: Progressing     Patient has remained free from falls this shift. Patient is alert and oriented times four. Bed to lowest position with door open. Patient care items and call light in reach. Patient uses call light appropriately for assist. Will continue to monitor. Please see fall assessment.

## 2024-04-30 NOTE — PLAN OF CARE
Problem: Discharge Planning  Goal: Discharge to home or other facility with appropriate resources  4/30/2024 0023 by Esperanza Singh RN  Outcome: Progressing  Flowsheets (Taken 4/30/2024 0023)  Discharge to home or other facility with appropriate resources:   Identify barriers to discharge with patient and caregiver   Arrange for needed discharge resources and transportation as appropriate   Identify discharge learning needs (meds, wound care, etc)  4/29/2024 1919 by Emma Holm, RN  Outcome: Progressing     Problem: Skin/Tissue Integrity  Goal: Absence of new skin breakdown  Description: 1.  Monitor for areas of redness and/or skin breakdown  2.  Assess vascular access sites hourly  3.  Every 4-6 hours minimum:  Change oxygen saturation probe site  4.  Every 4-6 hours:  If on nasal continuous positive airway pressure, respiratory therapy assess nares and determine need for appliance change or resting period.  4/30/2024 0023 by Esperanza Singh RN  Outcome: Progressing  4/29/2024 1919 by Emma Holm, RN  Outcome: Progressing     Problem: Safety - Adult  Goal: Free from fall injury  4/30/2024 0023 by Esperanza Singh RN  Outcome: Progressing  Flowsheets (Taken 4/30/2024 0023)  Free From Fall Injury: Instruct family/caregiver on patient safety  4/29/2024 1919 by Emma Holm, RN  Outcome: Progressing

## 2024-04-30 NOTE — CONSULTS
Physical Medicine & Rehabilitation  Consult Note      Admitting Physician:   Noel Elizondo MD    Primary Care Provider:   Stefanie Rollins MD     Reason for Consult:  Acute Inpatient Rehabilitation    Chief Complaint: Slurred speech, L face numbness, impaired gait, falls    History of Present Illness:  Referring Provider is requesting an evaluation for appropriate placement upon discharge from acute care. History from chart review and patient.    Samantha Calero is a 73 y.o. RHD female admitted to  [facility]  on 4/28/2024.      Patient admitted with slurred speech, L facial numbness, and gait disturbance with falls which began approximately 5 days prior to admission. CT/CTA showed several old CVAs and vertebral artery stenosis.     Neurology: noted evidence of chronic R cerebellar and pontine ischemic CVA. Treating medically with ASA, Plavix, Lipitor for secondary prevention.     Cardiology: Known history of CAD s/p PCI, chronic systolic heart failure, and dual-chamber AICD. AICD interrogation showed no recent events and 1 year left on the battery. No further ischemic workup planned by cardiology. Added low dose beta blocker to losartan for BP management. Corrected low potassium 4/30.     She notes that she continues to have impaired balance and coordination.     Review of Systems:  Constitutional: negative for anorexia, chills, fatigue, fevers, sweats and weight loss  Eyes: negative for redness and visual disturbance  Ears, nose, mouth, throat, and face: negative for earaches, sore throat and tinnitus  Respiratory: negative for cough and shortness of breath  Cardiovascular: negative for chest pain, dyspnea and palpitations  Gastrointestinal: negative for abdominal pain, change in bowel habits, constipation, nausea and vomiting  Genitourinary:negative for dysuria, frequency, hesitancy and urinary incontinence  Integument/breast: negative for pruritus and rash  Musculoskeletal:negative for stiff  artery. 5. Moderate stenosis in the proximal P2 segment of the left PCA. 6. Moderate to severe stenosis in the supraclinoid segment of the right internal carotid artery. 7. 70% stenosis in the cavernous/supraclinoid segment of the left internal carotid artery. 8. 50% stenosis at the origin of the left common carotid artery. 9. Up to 40% stenosis in the proximal bilateral internal carotid arteries.     XR CHEST PORTABLE    Result Date: 4/28/2024  EXAMINATION: ONE XRAY VIEW OF THE CHEST 4/28/2024 12:38 pm COMPARISON: None. HISTORY: ORDERING SYSTEM PROVIDED HISTORY: Stroke TECHNOLOGIST PROVIDED HISTORY: Stroke Reason for Exam: stroke symptoms FINDINGS: There is a left dual lead pacemaker.  There is no airspace consolidation or pleural effusion.  The pacemaker obscures the left mid lung.  The cardiomediastinal silhouette, pulmonary vessels and interstitium appear normal.     No radiographic evidence of an acute cardiopulmonary process.        Physical Exam:  BP (!) 154/96   Pulse 74   Temp 97.9 °F (36.6 °C) (Axillary)   Resp 16   Ht 1.626 m (5' 4\")   Wt 84.4 kg (186 lb 1.1 oz)   SpO2 97%   BMI 31.94 kg/m²     GEN: Well developed, well nourished, no acute distress.  HEENT: NCAT, PERRL, EOMI, mucous membranes pink and moist. Edentulous.   RESP: Lungs clear to auscultation. No rales or rhonchi. Respirations WNL and unlabored.  CV: Regular rate rhythm. No murmurs, rubs, or gallops.  ABD: soft, non-distended, non-tender. BS+ and equal.  NEURO: A&O x 3. Sensation intact to light touch. DTRs 2+. Impaired finger-nose-finger and heel-to-shin testing. Dysarthric.   MSK: Functional ROM. Muscle tone and bulk are normal bilaterally. Strength 4+/5 key muscles all extremities  EXT: No calf tenderness to palpation or edema BLEs.  SKIN: Warm dry and intact with good turgor.  PSYCH: Mood WNL. Affect WNL. Appropriately interactive.    Impression:    Ataxia secondary to ischemic CVA L cerebellum, R occipital lobe and old lacunar

## 2024-04-30 NOTE — PROGRESS NOTES
[facility]  Office: [phone]  Antoine Powell DO, Herman Mujica, DO, Alex Thompson DO, Trae Esposito, DO, Karla Chavez MD, Fiorella Cabral MD, Joe Dalton MD, Trish Hsu MD,  Thor Diggs MD, Laith Capmo MD, Mars Francisco MD,  Bello Torres DO, Mukesh Linda MD, Osman Gardner MD, Sreedhar Powell DO, Connie Romero MD,  Asif Batista DO, Tatyana Lam MD, Hazel Dorantes MD, Melly Hardwick MD, Chelle Guerrero MD,  Tristen Dyer MD, Duane Wright MD, Francis Thomas MD, Everardo Montgomery MD, Noel Elizondo MD, Lizzy Bustillos MD, Wayne Jaime DO, Bari Chadwick DO, Marychuy Mendoza MD,  Otis Garibay MD, Shirley Waterhouse, CNP,  Emiliana Wing CNP, Tae Conrad, CNP,  Linda Sinha, DNP, Lisbeth Jarrett, CNP, Sherita Recinos, CNP, Carlotta Melvin CNP, Nancy Daniels, CNP, Daniella Dooley, PA-C, Christie Browning PA-C, Lizbeth Giron, CNP, Arielle Bass, CNP, Michell Davila, CNP, Mary Calixto, CNP, Kallie Russo, CNP, Karmen Blackwell, CNS, Arabella Jamison, CNP, Daksha Hernandez CNP, Tracy Schwab, CNP         [facility]   IN-PATIENT SERVICE   [facility]    Progress Note    4/30/2024    5:22 PM    Name:   Samantha Calero  MRN:     [id number]     Acct:      [id number]   Room:   [address] [place] [address] Day:  2  Admit Date:  4/28/2024 12:18 PM    PCP:   Stefanie Rollins MD  Code Status:  DNR-CCA    Subjective:     Patient seen and examined this morning, resting in recliner, family bedside.  They are waiting for  to finish off the paperwork.  Denies any chest pain or shortness of breath.  Remained afebrile, vitals within normal range, lab work reviewed, creatinine 1.1.  S/p echo with EF 45-50%.  Negative for thrombus  Patient has a pacemaker and MRI cannot be obtained, neurology is recommending outpatient CT scan of the head without contrast in the next 7 to 10 days.  PT/OT on board.   working on placement to rehab.    Medications:     Allergies:  No Known        Physical Examination:     General appearance:  alert, cooperative and no distress.  Dysarthria  Mental Status:  oriented to person, place and time and normal affect  Lungs:  clear to auscultation bilaterally, normal effort  Heart:  regular rate and rhythm, no murmur  Abdomen:  soft, nontender, nondistended, normal bowel sounds, no masses, hepatomegaly, splenomegaly  Extremities:  no edema, redness, tenderness in the calves  Skin:  no gross lesions, rashes, induration    Assessment:     Hospital Problems             Last Modified POA    * (Principal) Stroke-like episode 4/28/2024 Yes    Gait abnormality 4/28/2024 Yes    Stenosis of both vertebral arteries 4/28/2024 Yes    Primary hypertension 4/28/2024 Yes     Plan:     Stroke-like symptom   -The patient states that she has had left-sided facial numbness and falls for the past five-days   -CT head showing multiple small old infarctions in the left cerebellar hemisphere and right occipital lobe   -CTA showing 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  -Echocardiogram negative for any cardiac thrombus.  -Continue aspirin, Plavix and atorvastatin   -Neurology following, recommended outpatient CT scan of the head without contrast  1 to 10 days.  -Continue aspirin Plavix and Lipitor    Hypertension   -Continue Coreg 3.125 and losartan 25 mg daily      Advanced care planning   -The patient states that she would desire aggressive care up until the point of a cardiac or respiratory arrest but would not wish to be resuscitated if her heart stopped and would not desire mechanical ventilation   -Code status has been changed to DNR-CCA with no intubation per patient request        working on ARU placement.    Noel Elizondo MD  4/30/2024  5:22 PM

## 2024-04-30 NOTE — PROGRESS NOTES
[facility]  [address]  [place] [address]  PH:  [phone] or [phone]  FAX:  [phone]            Brief history: Samantha Calero is a 73 y.o. old female admitted on 4/28/2024 with acute onset gait imbalance        Subjective: No new neurological events overnight.  The patient reports continued gait and balance and dizziness upon ambulation.  No headache, no diplopia     Objective: BP (!) 153/80   Pulse 64   Temp 98.2 °F (36.8 °C) (Oral)   Resp 16   Ht 1.626 m (5' 4\")   Wt 84.4 kg (186 lb 1.1 oz)   SpO2 95%   BMI 31.94 kg/m²       Medications:    carvedilol  3.125 mg Oral BID WC    sodium chloride  80 mL IntraVENous Once    sodium chloride flush  5-40 mL IntraVENous 2 times per day    enoxaparin  40 mg SubCUTAneous Daily    aspirin  81 mg Oral Daily    clopidogrel  75 mg Oral Daily    atorvastatin  40 mg Oral Nightly    losartan  25 mg Oral Daily          Neurological examination:    Mental status   Alert and oriented; intact memory with no confusion, speech or language problems; no hallucinations or delusions     Cranial nerves   II - visual fields intact to confrontation                                                III, IV, VI - extra-ocular muscles full: no pupillary defect; no SCOTT, no nystagmus, no ptosis                                                                      V - normal facial sensation                                                               VII - normal facial symmetry                                                             VIII - intact hearing                                                                             IX, X - symmetrical palate                                                                  XI - symmetrical shoulder shrug                                                       XII - midline tongue without atrophy or fasciculation     Motor function  Normal muscle bulk and tone; normal power 5/5, including

## 2024-04-30 NOTE — ACP (ADVANCE CARE PLANNING)
Advance Care Planning     Advance Care Planning Inpatient Note  [facility] Department    Today's Date: 4/30/2024  Unit: [facility]    Received request from family.  Upon review of chart and communication with care team, patient's decision making abilities are not in question.. Patient was/were present in the room during visit.    Goals of ACP Conversation:  Discuss advance care planning documents    Health Care Decision Makers:       Primary Decision Maker: Jerrod Calero - Brother/Sister - [phone]    Secondary Decision Maker: Tonya Calero - Other - [phone]  Summary:  Completed New Documents  Verified Healthcare Decision Maker      Advance Care Planning Documents (Patient Wishes):  Healthcare Power of /Advance Directive Appointment of Health Care Agent     Assessment:  Patient's Brother, Jerrod, asked if Patient could have assistance completing her advance directives.Writer met with Patient in her room. Patient was agreeable to completing her health care power of  form. Patient shared that her brother knows her health care wishes and that she trusts he will honor them. Pt noted that she and her brother have different Sabianist and political views and that they also share views in common. Pt mentioned that she decided on her code status, which is documented in her chart.     Interventions:  Assisted in the completion of documents according to patient's wishes at this time    Care Preferences Communicated:   Code status    Outcomes/Plan:  New advance directive completed.  Returned original document(s) to patient, as well as copies for distribution to appointed agents  Copy of advance directive given to staff to scan into medical record.  Routed ACP note to attending provider or other IDT member.    Electronically signed by HECTOR Briggs on 4/30/2024 at 6:54 PM

## 2024-04-30 NOTE — CARE COORDINATION
spoke to patient for discharge planning. Patient requested for rehab at [facility]. Referrals sent to facilities. Called to [facility] but was told there is not a bed currently. Bed will be available later this week. Spoke to patient after and her family had been in and are asking to now have [facility] as primary facility. Call placed and now waiting on return call to determine if patient is accepted.

## 2024-04-30 NOTE — PROGRESS NOTES
Occupational Therapy  Facility/Department: [address]  Occupational Therapy Daily Treatment Note    Name: Samantha Calero  : 1950  MRN: [id number]  Date of Service: 2024    Discharge Recommendations:  Patient would benefit from continued therapy after discharge  OT Equipment Recommendations  Other: TBD       Patient Diagnosis(es): The primary encounter diagnosis was Stroke-like episode. A diagnosis of Cerebrovascular accident (CVA), unspecified mechanism (HCC) was also pertinent to this visit.  Past Medical History:  has a past medical history of CAD (coronary artery disease) and Hypertension.  Past Surgical History:  has a past surgical history that includes Coronary stent placement and pacemaker placement.           Assessment   Performance deficits / Impairments: Decreased functional mobility ;Decreased ADL status;Decreased safe awareness;Decreased cognition;Decreased high-level IADLs;Decreased balance;Decreased endurance;Decreased coordination;Decreased fine motor control  Assessment: Pt progressing towards STGs. Pt continues to demonstrate above deficits. Pt to benefit from continued therapy services while hospitalized to maximize pt's safety and independence in performing functional tasks. At this time, pt has not demonstrated the functional ability to safely return to prior living arrangements, continued intensive skilled OT intervention recommended prior to an eventual return to home.  Prognosis: Good  REQUIRES OT FOLLOW-UP: Yes  Activity Tolerance  Activity Tolerance: Patient Tolerated treatment well  Activity Tolerance Comments: stood 7 min and then 5 min st sink for grooming tasks with sitting rest break on shower seat        Plan   Occupational Therapy Plan  Times Per Week: 5-6x/wk  Current Treatment Recommendations: Balance training, Functional mobility training, Strengthening, Endurance training, Neuromuscular re-education, Equipment evaluation, education, & procurement, Patient/Caregiver

## 2024-04-30 NOTE — CARE COORDINATION
left message with [facility] to check if referral had been received. Waiting on return call.

## 2024-04-30 NOTE — PROGRESS NOTES
[facility]  Progress Note                   Date:   4/30/2024  Patient name: Samantha Calero  Date of admission:  4/28/2024 12:18 PM  MRN:   [id number]  YOB: 1950  PCP: Stefanie Rollins MD    Reason for Admission: Stroke-like episode [R29.90]    Subjective:       Clinical Changes /Abnormalities: Stable overnight. No acute CV issues/concerns. Labs, vitals, & tele reviewed. Remains SR. 5 beat run NSVT noted along with demand paced beats    Review of Systems    Medications:   Scheduled Meds:   sodium chloride  80 mL IntraVENous Once    sodium chloride flush  5-40 mL IntraVENous 2 times per day    enoxaparin  40 mg SubCUTAneous Daily    aspirin  81 mg Oral Daily    clopidogrel  75 mg Oral Daily    atorvastatin  40 mg Oral Nightly    losartan  25 mg Oral Daily     Continuous Infusions:   sodium chloride       CBC:   Recent Labs     04/28/24  1222 04/29/24  0612 04/30/24  0504   WBC 7.3 7.3 7.1   HGB 17.1* 17.3* 15.8   PLT 89* 75* 88*     BMP:    Recent Labs     04/28/24  1222 04/29/24  0612 04/30/24  0504    139 140   K 3.8 3.6* 3.9    104 107   CO2 21 21 24   BUN 18 17 27*   CREATININE 0.9 0.8 1.1*   GLUCOSE 103* 80 103*     Hepatic:No results for input(s): \"AST\", \"ALT\", \"ALB\", \"BILITOT\", \"ALKPHOS\" in the last 72 hours.  Troponin:   Recent Labs     04/28/24  1222 04/28/24  1558   TROPHS 16* 15*     BNP: No results for input(s): \"BNP\" in the last 72 hours.  Lipids: No results for input(s): \"CHOL\", \"HDL\" in the last 72 hours.    Invalid input(s): \"LDLCALCU\"  INR:   Recent Labs     04/28/24  1222   INR 1.0       Objective:   Vitals: BP (!) 142/84   Pulse 70   Temp 97.9 °F (36.6 °C) (Axillary)   Resp 16   Ht 1.626 m (5' 4\")   Wt 84.4 kg (186 lb 1.1 oz)   SpO2 92%   BMI 31.94 kg/m²   General appearance: alert and cooperative with exam  HEENT: Head: Normocephalic, no lesions, without obvious abnormality.  Neck:no JVD, trachea midline, no adenopathy  Lungs: Clear to  auscultation throughout. No distress on RA  Heart: Regular rate and rhythm, s1/s2 auscultated, no murmurs, SR 67 currently  Abdomen: soft, non-tender, bowel sounds active  Extremities: no edema  Neurologic: not done    Echo 4/28/24   Left Ventricle: Mildly reduced left ventricular systolic function with a visually estimated EF of 45 - 50%. Left ventricle size is normal. Moderately increased wall thickness. See diagram for wall motion findings. Abnormal diastolic function.    Right Ventricle: Mildly reduced systolic function. TAPSE is abnormal.    Aortic Valve: Mild sclerosis of the aortic valve cusp.    Mitral Valve: Mildly thickened leaflet. Mild annular calcification of the mitral valve.    Tricuspid Valve: Mild regurgitation. Normal RVSP.    Image quality is adequate.    Assessment / Acute Cardiac Problems:   Stroke like symptoms   Small old infarct in L Cerebellar/R occipital and old lacunar infarction, severe stenosis in mid basilar artery   Known CAD s/p PCI in past- seen by [facility]- details unclear  Ch Sys HF, HFrEF, s/p ICD (appears dual chamber on CXR)  Renal artery stenosis  HTN- bp better  DL    Patient Active Problem List:     Stroke-like episode     Gait abnormality     Stenosis of both vertebral arteries     Primary hypertension      Plan of Treatment:   Stable. Denies any chest pain/pressure or SOB/VILLAR. States she continues to have a little lightheadedness upon standing which resolves quickly but still \"have no balance.\"  BP stable. Continue Losartan and will add low dose BB  Encourage PO fluid intake. Creat up to 1.1 today.   Keep K >4 and Mg > 2. Will give 1 dose oral K+ today  Continue ASA & statin  Plavix per neurology  AICD interrogation reviewed. No recent events. About 1yr left on battery.   Echo as above. No further ischemic work-up planned at this time  Discussed with patient importance of med compliance at home along with ambulatory monitoring.     Electronically signed by Omayra ELENA

## 2024-05-01 VITALS
BODY MASS INDEX: 31.77 KG/M2 | RESPIRATION RATE: 18 BRPM | DIASTOLIC BLOOD PRESSURE: 90 MMHG | SYSTOLIC BLOOD PRESSURE: 134 MMHG | HEIGHT: 64 IN | WEIGHT: 186.07 LBS | OXYGEN SATURATION: 95 % | HEART RATE: 66 BPM | TEMPERATURE: 97.7 F

## 2024-05-01 PROBLEM — R29.90 STROKE-LIKE EPISODE: Status: RESOLVED | Noted: 2024-04-28 | Resolved: 2024-05-01

## 2024-05-01 LAB
ANION GAP SERPL CALCULATED.3IONS-SCNC: 9 MMOL/L (ref 9–17)
BASOPHILS # BLD: 0.1 K/UL (ref 0–0.2)
BASOPHILS NFR BLD: 2 % (ref 0–2)
BUN SERPL-MCNC: 25 MG/DL (ref 8–23)
CALCIUM SERPL-MCNC: 9.3 MG/DL (ref 8.6–10.4)
CHLORIDE SERPL-SCNC: 109 MMOL/L (ref 98–107)
CO2 SERPL-SCNC: 25 MMOL/L (ref 20–31)
CREAT SERPL-MCNC: 1 MG/DL (ref 0.5–0.9)
EOSINOPHIL # BLD: 0.1 K/UL (ref 0–0.4)
EOSINOPHILS RELATIVE PERCENT: 2 % (ref 1–4)
ERYTHROCYTE [DISTWIDTH] IN BLOOD BY AUTOMATED COUNT: 13.4 % (ref 12.5–15.4)
GFR, ESTIMATED: 60 ML/MIN/1.73M2
GLUCOSE SERPL-MCNC: 99 MG/DL (ref 70–99)
HCT VFR BLD AUTO: 46.4 % (ref 36–46)
HGB BLD-MCNC: 15.8 G/DL (ref 12–16)
LYMPHOCYTES NFR BLD: 1.4 K/UL (ref 1–4.8)
LYMPHOCYTES RELATIVE PERCENT: 20 % (ref 24–44)
MCH RBC QN AUTO: 32.1 PG (ref 26–34)
MCHC RBC AUTO-ENTMCNC: 34 G/DL (ref 31–37)
MCV RBC AUTO: 94.2 FL (ref 80–100)
MONOCYTES NFR BLD: 0.7 K/UL (ref 0.1–1.2)
MONOCYTES NFR BLD: 10 % (ref 2–11)
NEUTROPHILS NFR BLD: 66 % (ref 36–66)
NEUTS SEG NFR BLD: 4.7 K/UL (ref 1.8–7.7)
PLATELET # BLD AUTO: 90 K/UL (ref 140–450)
PMV BLD AUTO: 7.4 FL (ref 6–12)
POTASSIUM SERPL-SCNC: 4.3 MMOL/L (ref 3.7–5.3)
RBC # BLD AUTO: 4.93 M/UL (ref 4–5.2)
SODIUM SERPL-SCNC: 143 MMOL/L (ref 135–144)
WBC OTHER # BLD: 7 K/UL (ref 3.5–11)

## 2024-05-01 PROCEDURE — 99239 HOSP IP/OBS DSCHRG MGMT >30: CPT | Performed by: STUDENT IN AN ORGANIZED HEALTH CARE EDUCATION/TRAINING PROGRAM

## 2024-05-01 PROCEDURE — 99233 SBSQ HOSP IP/OBS HIGH 50: CPT | Performed by: SURGERY

## 2024-05-01 PROCEDURE — 92610 EVALUATE SWALLOWING FUNCTION: CPT

## 2024-05-01 PROCEDURE — 80048 BASIC METABOLIC PNL TOTAL CA: CPT

## 2024-05-01 PROCEDURE — 92523 SPEECH SOUND LANG COMPREHEN: CPT

## 2024-05-01 PROCEDURE — 2580000003 HC RX 258: Performed by: STUDENT IN AN ORGANIZED HEALTH CARE EDUCATION/TRAINING PROGRAM

## 2024-05-01 PROCEDURE — 6370000000 HC RX 637 (ALT 250 FOR IP): Performed by: STUDENT IN AN ORGANIZED HEALTH CARE EDUCATION/TRAINING PROGRAM

## 2024-05-01 PROCEDURE — 85025 COMPLETE CBC W/AUTO DIFF WBC: CPT

## 2024-05-01 PROCEDURE — 6360000002 HC RX W HCPCS: Performed by: STUDENT IN AN ORGANIZED HEALTH CARE EDUCATION/TRAINING PROGRAM

## 2024-05-01 PROCEDURE — 99232 SBSQ HOSP IP/OBS MODERATE 35: CPT | Performed by: PSYCHIATRY & NEUROLOGY

## 2024-05-01 PROCEDURE — 36415 COLL VENOUS BLD VENIPUNCTURE: CPT

## 2024-05-01 PROCEDURE — 6370000000 HC RX 637 (ALT 250 FOR IP): Performed by: NURSE PRACTITIONER

## 2024-05-01 RX ORDER — LOSARTAN POTASSIUM 25 MG/1
25 TABLET ORAL DAILY
Qty: 30 TABLET | Refills: 3 | Status: SHIPPED | OUTPATIENT
Start: 2024-05-02

## 2024-05-01 RX ORDER — ASPIRIN 81 MG/1
81 TABLET, CHEWABLE ORAL DAILY
Qty: 30 TABLET | Refills: 3 | Status: SHIPPED | OUTPATIENT
Start: 2024-05-02

## 2024-05-01 RX ORDER — CARVEDILOL 3.12 MG/1
3.12 TABLET ORAL 2 TIMES DAILY WITH MEALS
Qty: 60 TABLET | Refills: 3 | Status: SHIPPED | OUTPATIENT
Start: 2024-05-01

## 2024-05-01 RX ORDER — ATORVASTATIN CALCIUM 40 MG/1
40 TABLET, FILM COATED ORAL NIGHTLY
Qty: 30 TABLET | Refills: 3 | Status: SHIPPED | OUTPATIENT
Start: 2024-05-01

## 2024-05-01 RX ORDER — CLOPIDOGREL BISULFATE 75 MG/1
75 TABLET ORAL DAILY
Qty: 30 TABLET | Refills: 3 | Status: SHIPPED | OUTPATIENT
Start: 2024-05-02

## 2024-05-01 RX ADMIN — CARVEDILOL 3.12 MG: 3.12 TABLET, FILM COATED ORAL at 08:39

## 2024-05-01 RX ADMIN — LOSARTAN POTASSIUM 25 MG: 25 TABLET, FILM COATED ORAL at 08:39

## 2024-05-01 RX ADMIN — CLOPIDOGREL BISULFATE 75 MG: 75 TABLET ORAL at 08:39

## 2024-05-01 RX ADMIN — ENOXAPARIN SODIUM 40 MG: 100 INJECTION SUBCUTANEOUS at 08:38

## 2024-05-01 RX ADMIN — SODIUM CHLORIDE, PRESERVATIVE FREE 10 ML: 5 INJECTION INTRAVENOUS at 08:39

## 2024-05-01 RX ADMIN — ASPIRIN 81 MG: 81 TABLET, CHEWABLE ORAL at 08:39

## 2024-05-01 NOTE — PROGRESS NOTES
[facility]  [address]  [place] [address]  PH:  [phone] or [phone]  FAX:  [phone]            Brief history: Samantha Calero is a 73 y.o. old female admitted on 4/28/2024 with acute onset gait imbalance        Subjective: No new neurological events overnight.  Patient is able to ambulate with walker.  No falls.  Able to walk with minimal assist 30 feet     Objective: BP (!) 149/74   Pulse 63   Temp 97.2 °F (36.2 °C) (Axillary)   Resp 18   Ht 1.626 m (5' 4\")   Wt 84.4 kg (186 lb 1.1 oz)   SpO2 97%   BMI 31.94 kg/m²       Medications:    carvedilol  3.125 mg Oral BID WC    sodium chloride  80 mL IntraVENous Once    sodium chloride flush  5-40 mL IntraVENous 2 times per day    enoxaparin  40 mg SubCUTAneous Daily    aspirin  81 mg Oral Daily    clopidogrel  75 mg Oral Daily    atorvastatin  40 mg Oral Nightly    losartan  25 mg Oral Daily          Neurological examination:    Mental status   Alert and oriented; intact memory with no confusion, speech or language problems; no hallucinations or delusions     Cranial nerves   II - visual fields intact to confrontation                                                III, IV, VI - extra-ocular muscles full: no pupillary defect; no SCOTT, no nystagmus, no ptosis                                                                      V - normal facial sensation                                                               VII - normal facial symmetry                                                             VIII - intact hearing                                                                             IX, X - symmetrical palate                                                                  XI - symmetrical shoulder shrug                                                       XII - midline tongue without atrophy or fasciculation     Motor function  Normal muscle bulk and tone; normal power 5/5, including fine

## 2024-05-01 NOTE — PROGRESS NOTES
[facility]  Office: [phone]  Antoine Powell DO, Herman Mujica DO, Alex Thompson DO, Trae Esposito DO, Karla Chavez MD, Fiorella Cabral MD, Joe Dalton MD, Trish Hsu MD,  Thor Diggs MD, Laith Campo MD, Mars Francisco MD,  Bello Torres DO, Mukesh Linda MD, Osman Gardner MD, Sreedhar Powell DO, Connie Romero MD,  Asif Batista DO, Tatyana Lam MD, Hazel Dorantes MD, Melly Hardwick MD, Chelle Guerrero MD,  Tristen Dyer MD, Duane Wright MD, Francis Thomas MD, Everardo Montgomery MD, Noel Elizondo MD, Lizzy Bustillos MD, Wayne Jaime DO, Bari Chadwick DO, Marychuy Mendoza MD,  Otis Garibay MD, Shirley Waterhouse, CNP,  Emiliana Wing CNP, Tae Conrad, CNP,  Linda Sinha, DNP, Lisbeth Jarrett, CNP, Sherita Recinos, CNP, Carlotta Melvin CNP, Nancy Daniels, CNP, Daniella Dooley, PA-C, Christie Browning PA-C, Lizbeth Giron, CNP, Arielle Bass, CNP, Michell Davila, CNP, Mary Calixto, CNP, Kallie Russo, CNP, Karmen Blackwell, CNS, Arabella Jamison, CNP, Daksha Hernandez CNP, Tracy Schwab, CNP         [facility]   IN-PATIENT SERVICE   [facility]    Progress Note    2024    7:01 PM    Name:   Samantha Calero  MRN:     [id number]     Acct:      [id number]   Room:   [facility] Day:  3  Admit Date:  2024 12:18 PM    PCP:   Stefanie Rollins MD  Code Status:  Prior    Subjective:     Patient seen and examined this morning.  No acute events overnight.  The next morning  working on placement, possible discharge today to rehab facility.    Medications:     Allergies:  No Known Allergies    Current Meds:   Scheduled Meds:       Continuous Infusions:       PRN Meds:     Data:     Vitals:  BP (!) 134/90   Pulse 66   Temp 97.7 °F (36.5 °C) (Oral)   Resp 18   Ht 1.626 m (5' 4\")   Wt 84.4 kg (186 lb 1.1 oz)   SpO2 95%   BMI 31.94 kg/m²   Temp (24hrs), Av.7 °F (36.5 °C), Min:97.2 °F (36.2 °C), Max:98.1 °F (36.7 °C)    No results for input(s):  she has had left-sided facial numbness and falls for the past five-days   -CT head showing multiple small old infarctions in the left cerebellar hemisphere and right occipital lobe   -CTA showing 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  -Echocardiogram negative for any cardiac thrombus.  -Continue aspirin, Plavix and atorvastatin   -Neurology following, recommended outpatient CT scan of the head without contrast  1 to 10 days.  -Continue aspirin Plavix and Lipitor    Hypertension   -Continue Coreg 3.125 and losartan 25 mg daily      Advanced care planning   -The patient states that she would desire aggressive care up until the point of a cardiac or respiratory arrest but would not wish to be resuscitated if her heart stopped and would not desire mechanical ventilation   -Code status has been changed to DNR-CCA with no intubation per patient request        working on ARU placement.    Noel Elizondo MD  5/1/2024  7:01 PM

## 2024-05-01 NOTE — PROGRESS NOTES
SPEECH LANGUAGE PATHOLOGY  Facility/Department: [address]  Initial Speech/Language/Cognitive Assessment    NAME: Samantha Calero  : 1950   MRN: [id number]  ADMISSION DATE: 2024  ADMITTING DIAGNOSIS: has Gait abnormality; Stenosis of both vertebral arteries; and Primary hypertension on their problem list.    Date of Eval: 2024   Evaluating Therapist: [facility]    RECENT RESULTS  CT OF HEAD: 2024  IMPRESSION:  1. No acute intracranial abnormality.  2. Small old infarctions in the left cerebellar hemisphere and in the right  occipital lobe.  Old lacunar infarct in the damian.  3. Right dominance of the vertebral arteries with hypoplastic left vertebral  artery.  Superimposed occlusion in the V1 and V2 segments of the left  vertebral artery.  4. 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  5. Moderate stenosis in the proximal P2 segment of the left PCA.  6. Moderate to severe stenosis in the supraclinoid segment of the right  internal carotid artery.  7. 70% stenosis in the cavernous/supraclinoid segment of the left internal  carotid artery.  8. 50% stenosis at the origin of the left common carotid artery.  9. Up to 40% stenosis in the proximal bilateral internal carotid arteries.    Primary Complaint:   Samantha Calero is a 73 y.o. female with a past medical history of CAD and HTN who presented to the emergency department on 2024 complaining of slurred speech, left-sided facial numbness and gait disturbance with falls. The patient states that her symptoms began five-days-ago and have remained about the same. The patient reports that the left side of her face in numb and says that she has been falling to the right. In the ED, the patient was afebrile and nontoxic appearing. CT and CTA of the head and neck showed several old strokes as well as vertebral artery stenosis but were negative for acute CVA. The patient is admitted to internal medicine for further management of likely subacute  None  Dysarthric Characteristics: Blended word boundaries;Imprecise  Overall Impairment Severity: Mild    Auditory Comprehension  Comprehension: Unable to assess (Pt preparing to discharge.)    Verbal Expression  Expression: Unable to assess (Pt preparing to discharge.)                             Cognition:      Attention  Attention: Unable to assess (Pt preparing to discharge.)  Memory  Memory: Unable to assess (Pt preparing to discharge.)  Problem Solving  Problem Solving: Unable to assess (Pt preparing to discharge.)      Prognosis:  Speech Therapy Prognosis  Prognosis: Good  Prognosis Considerations: Age;Participation Level;Previous Level of Function;Severity of Impairments  Individuals consulted  Consulted and agree with results and recommendations: Patient;RN    Education:  Patient Education: reults, recommendations, risks of aspiration  Patient Education Response: Verbalizes understanding          Therapy Time:   Individual Concurrent Group Co-treatment   Time In 1459         Time Out 1509         Minutes 10                 Electronically signed by OMI Heart on 5/1/2024 at 3:18 PM

## 2024-05-01 NOTE — DISCHARGE SUMMARY
[facility]  Office: [phone]  Antoine Powell DO, Herman Mujica DO, Alex Thompson DO, Trae Esposito DO, Karla Chavez MD, Fiorella Cabral MD, Joe Dalton MD, Trish Hsu MD,  Thor Diggs MD, Laith Campo MD, Bari Chadwick DO, Mars Francisco MD,  Bello Torres DO, Mukesh Linda MD, Osman Gardner MD, Sreedhar Powell DO, Connie Romero MD,  Asif Batista DO, Tatyana Lam MD, Hazel Dorantes MD, Melly Hardwick MD, Chelle Guerrero MD,  Tristen Dyer MD, Duane Wright MD, Francis Thomas MD, Wayne Jaime DO, Marychuy Mendoza MD,  Otis Garibay MD, Shirley Waterhouse, CNP,  Emiliana Wing, CNP, Mary Calixto, CNP, Tae Conrad, CNP,  Linda Sinha, DNP, Lisbeth Jarrett, CNP, Sherita Recinos, CNP, Kallie Russo, CNP, Carlotta Melvin, CNP, Nancy Daniels, CNP, Pedro Chavis PA-C, Karmen Blackwell, CNS, Arabella Jamison, CNP, Daksha Hernandez, CNP         [facility]   IN-PATIENT SERVICE   [facility]    Discharge Summary     Patient ID: Samantha Calero  :  1950   MRN: [id number]     ACCOUNT:  [id number]   Patient's PCP: Stefanie Rollins MD  Admit Date: 2024   Discharge Date: 2024  Length of Stay: 3  Code Status:  Prior  Admitting Physician: Noel Elizondo MD  Discharge Physician: Noel Elizondo MD     Active Discharge Diagnoses:     Hospital Problem Lists:  Principal Problem (Resolved):    Stroke-like episode  Active Problems:    Gait abnormality    Stenosis of both vertebral arteries    Primary hypertension      Admission Condition:  fair     Discharged Condition: good    Hospital Stay:     Hospital Course:  Samantha Calero is a 73 y.o. female who was admitted for the management of Stroke-like episode , presented to ER with No chief complaint on file.    Samantha Calero is a 73 y.o. female with a past medical history of CAD and HTN who presented to the emergency department on 2024 complaining of slurred speech, left-sided facial numbness and    HGB 17.3* 15.8 15.8   HCT 50.4* 46.3* 46.4*   MCV 93.4 94.4 94.2   MCH 32.1 32.3 32.1   MCHC 34.4 34.2 34.0   RDW 13.2 13.2 13.4   PLT 75* 88* 90*   MPV 7.4 7.8 7.4     Chemistry:  Recent Labs     04/29/24  0612 04/30/24  0504 05/01/24  0700    140 143   K 3.6* 3.9 4.3    107 109*   CO2 21 24 25   GLUCOSE 80 103* 99   BUN 17 27* 25*   CREATININE 0.8 1.1* 1.0*   ANIONGAP 14 9 9   CALCIUM 9.6 9.4 9.3   No results for input(s): \"PROT\", \"LABALBU\", \"LABA1C\", \"B9KOVQX\", \"W5PSDVP\", \"FT4\", \"TSH\", \"AST\", \"ALT\", \"LDH\", \"GGT\", \"ALKPHOS\", \"BILITOT\", \"BILIDIR\", \"AMMONIA\", \"AMYLASE\", \"LIPASE\", \"LACTATE\", \"CHOL\", \"HDL\", \"CHOLHDLRATIO\", \"TRIG\", \"VLDL\", \"SXF77EQ\", \"PHENYTOIN\", \"PHENYF\", \"URICACID\", \"POCGLU\" in the last 72 hours.    Invalid input(s): \"LABGGT\", \"LDLCHOLESTEROL\"  ABG:No results found for: \"POCPH\", \"PHART\", \"PH\", \"POCPCO2\", \"GPA6HRZ\", \"PCO2\", \"POCPO2\", \"PO2ART\", \"PO2\", \"POCHCO3\", \"ASA0WHD\", \"HCO3\", \"NBEA\", \"PBEA\", \"BEART\", \"BE\", \"THGBART\", \"THB\", \"PHM7DYI\", \"TKVY5IIF\", \"U0PROSEC\", \"O2SAT\", \"FIO2\"  No results found for: \"SPECIAL\"  No results found for: \"CULTURE\"    Radiology:  CT HEAD WO CONTRAST    Result Date: 4/28/2024  1. No acute intracranial abnormality. 2. Small old infarctions in the left cerebellar hemisphere and in the right occipital lobe.  Old lacunar infarct in the damian. 3. Right dominance of the vertebral arteries with hypoplastic left vertebral artery.  Superimposed occlusion in the V1 and V2 segments of the left vertebral artery. 4. 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 5. Moderate stenosis in the proximal P2 segment of the left PCA. 6. Moderate to severe stenosis in the supraclinoid segment of the right internal carotid artery. 7. 70% stenosis in the cavernous/supraclinoid segment of the left internal carotid artery. 8. 50% stenosis at the origin of the left common carotid artery. 9. Up to 40% stenosis in the proximal bilateral internal carotid arteries.     CTA HEAD NECK W

## 2024-05-01 NOTE — DISCHARGE INSTR - COC
Continuity of Care Form    Patient Name: Samantha Calero   :  1950  MRN:  [id number]    Admit date:  2024  Discharge date:  2024    Code Status Order: DNR-CCA   Advance Directives:     Admitting Physician:  Noel Elizondo MD  PCP: Stefanie Rollins MD    Discharging Nurse: Eli DAUGHERTY  Discharging Hospital Unit/Room#: 326/326-01  Discharging Unit Phone Number: [phone]    Emergency Contact:   Extended Emergency Contact Information  Primary Emergency Contact: Jerrod Calero  Address: [address] [place] [address]  Home Phone: [phone]  Relation: Brother/Sister  Secondary Emergency Contact: Tonya Calero  Address: [address] [place] [address]  Mobile Phone: [phone]  Relation: Other    Past Surgical History:  Past Surgical History:   Procedure Laterality Date    CORONARY STENT PLACEMENT      PACEMAKER PLACEMENT         Immunization History:     There is no immunization history on file for this patient.    Active Problems:  Patient Active Problem List   Diagnosis Code    Stroke-like episode R29.90    Gait abnormality R26.9    Stenosis of both vertebral arteries I65.03    Primary hypertension I10       Isolation/Infection:   Isolation            No Isolation          Patient Infection Status       None to display            Nurse Assessment:  Last Vital Signs: BP (!) 149/74   Pulse 63   Temp 97.2 °F (36.2 °C) (Axillary)   Resp 18   Ht 1.626 m (5' 4\")   Wt 84.4 kg (186 lb 1.1 oz)   SpO2 97%   BMI 31.94 kg/m²     Last documented pain score (0-10 scale): Pain Level: 0  Last Weight:   Wt Readings from Last 1 Encounters:   24 84.4 kg (186 lb 1.1 oz)     Mental Status:  oriented and alert    IV Access:  - None    Nursing Mobility/ADLs:  Walking   Assisted  Transfer  Assisted  Bathing  Assisted  Dressing  Assisted  Toileting  Assisted  Feeding  Independent  Med Admin  Independent  Med Delivery   whole    Wound Care Documentation and Therapy:

## 2024-05-01 NOTE — PROGRESS NOTES
[facility]  Progress Note                   Date:   5/1/2024  Patient name: Samantha Calero  Date of admission:  4/28/2024 12:18 PM  MRN:   [id number]  YOB: 1950  PCP: Stefnaie Rollins MD    Reason for Admission: Stroke-like episode [R29.90]    Subjective:       Clinical Changes /Abnormalities:   Patient seen and examined. Didn't;t sleep well, Denies chest pain or shortness of breath. Tele/vitals/labs reviewed .      Review of Systems    Medications:   Scheduled Meds:   carvedilol  3.125 mg Oral BID WC    sodium chloride  80 mL IntraVENous Once    sodium chloride flush  5-40 mL IntraVENous 2 times per day    enoxaparin  40 mg SubCUTAneous Daily    aspirin  81 mg Oral Daily    clopidogrel  75 mg Oral Daily    atorvastatin  40 mg Oral Nightly    losartan  25 mg Oral Daily     Continuous Infusions:   sodium chloride       CBC:   Recent Labs     04/29/24  0612 04/30/24  0504 05/01/24  0700   WBC 7.3 7.1 7.0   HGB 17.3* 15.8 15.8   PLT 75* 88* 90*       BMP:    Recent Labs     04/29/24  0612 04/30/24  0504 05/01/24  0700    140 143   K 3.6* 3.9 4.3    107 109*   CO2 21 24 25   BUN 17 27* 25*   CREATININE 0.8 1.1* 1.0*   GLUCOSE 80 103* 99       Hepatic:No results for input(s): \"AST\", \"ALT\", \"BILITOT\", \"ALKPHOS\" in the last 72 hours.    Invalid input(s): \"ALB\"  Troponin:   Recent Labs     04/28/24  1222 04/28/24  1558   TROPHS 16* 15*       BNP: No results for input(s): \"BNP\" in the last 72 hours.  Lipids: No results for input(s): \"CHOL\", \"HDL\" in the last 72 hours.    Invalid input(s): \"LDLCALCU\"  INR:   Recent Labs     04/28/24  1222   INR 1.0         Objective:   Vitals: BP (!) 149/74   Pulse 63   Temp 97.2 °F (36.2 °C) (Axillary)   Resp 18   Ht 1.626 m (5' 4\")   Wt 84.4 kg (186 lb 1.1 oz)   SpO2 97%   BMI 31.94 kg/m²   General appearance: alert and cooperative with exam  HEENT: Head: Normocephalic, no lesions, without obvious abnormality.  Neck:no JVD, trachea  midline, no adenopathy  Lungs: Clear to auscultation throughout. No distress on RA  Heart: Regular rate and rhythm, s1/s2 auscultated, no murmurs, SR 67 currently  Abdomen: soft, non-tender, bowel sounds active  Extremities: no edema  Neurologic: not done    Echo 4/28/24   Left Ventricle: Mildly reduced left ventricular systolic function with a visually estimated EF of 45 - 50%. Left ventricle size is normal. Moderately increased wall thickness. See diagram for wall motion findings. Abnormal diastolic function.    Right Ventricle: Mildly reduced systolic function. TAPSE is abnormal.    Aortic Valve: Mild sclerosis of the aortic valve cusp.    Mitral Valve: Mildly thickened leaflet. Mild annular calcification of the mitral valve.    Tricuspid Valve: Mild regurgitation. Normal RVSP.    Image quality is adequate.    Assessment / Acute Cardiac Problems:   Stroke like symptoms   Small old infarct in L Cerebellar/R occipital and old lacunar infarction, severe stenosis in mid basilar artery   Known CAD s/p PCI in past- seen by [facility]- details unclear  Ch Sys HF, HFrEF, s/p ICD (appears dual chamber on CXR)  Renal artery stenosis  HTN- bp better  DL    Patient Active Problem List:     Stroke-like episode     Gait abnormality     Stenosis of both vertebral arteries     Primary hypertension      Plan of Treatment:   Stable. BP stable. Continue Losartan and coreg   Continue ASA & statin  Plavix per neurology  AICD interrogation reviewed. No recent events. About 1yr left on battery.   Echo as above. No further ischemic work-up planned at this time  Discussed with patient importance of med compliance at home along with ambulatory monitoring.   Follow up with cardiology in 2-3 weeks     Electronically signed by EDMOND Resendiz NP on 5/1/2024 at 9:36 AM  [facility]  [phone]

## 2024-05-01 NOTE — PROGRESS NOTES
Report called to Xochilt at [facility].  Writer's phone number given for any questions.

## 2024-05-01 NOTE — CARE COORDINATION
spoke to Amanda with [facility]. Patient accepted to facility and can go today. Amanda scheduled transport for 1530 today with their transport van. Will need to fax DONOVAN to [phone].    Report number: [phone]

## 2024-05-01 NOTE — PLAN OF CARE
Problem: Discharge Planning  Goal: Discharge to home or other facility with appropriate resources  5/1/2024 1353 by Kobe Stanley, RN  Outcome: Completed  5/1/2024 0205 by Esperanza Singh RN  Outcome: Progressing  Flowsheets (Taken 5/1/2024 0205)  Discharge to home or other facility with appropriate resources:   Identify barriers to discharge with patient and caregiver   Identify discharge learning needs (meds, wound care, etc)   Arrange for needed discharge resources and transportation as appropriate   Arrange for interpreters to assist at discharge as needed     Problem: Skin/Tissue Integrity  Goal: Absence of new skin breakdown  Description: 1.  Monitor for areas of redness and/or skin breakdown  2.  Assess vascular access sites hourly  3.  Every 4-6 hours minimum:  Change oxygen saturation probe site  4.  Every 4-6 hours:  If on nasal continuous positive airway pressure, respiratory therapy assess nares and determine need for appliance change or resting period.  5/1/2024 1353 by Kobe Stanley, RN  Outcome: Completed  5/1/2024 0205 by Esperanza Singh RN  Outcome: Progressing     Problem: Safety - Adult  Goal: Free from fall injury  5/1/2024 1353 by Kobe Stanley, RN  Outcome: Completed  5/1/2024 0205 by Esperanza Singh RN  Outcome: Progressing  Flowsheets (Taken 5/1/2024 0205)  Free From Fall Injury: Instruct family/caregiver on patient safety

## 2024-05-01 NOTE — CARE COORDINATION
Social work: faxed updates to [facility]. Await their review and acceptance. Pt does not need precert. Sent PM& R consult also. Will need jason at discharge. Linda kent

## 2024-05-01 NOTE — PLAN OF CARE
Problem: Discharge Planning  Goal: Discharge to home or other facility with appropriate resources  Outcome: Progressing  Flowsheets (Taken 5/1/2024 0205)  Discharge to home or other facility with appropriate resources:   Identify barriers to discharge with patient and caregiver   Identify discharge learning needs (meds, wound care, etc)   Arrange for needed discharge resources and transportation as appropriate   Arrange for interpreters to assist at discharge as needed     Problem: Skin/Tissue Integrity  Goal: Absence of new skin breakdown  Description: 1.  Monitor for areas of redness and/or skin breakdown  2.  Assess vascular access sites hourly  3.  Every 4-6 hours minimum:  Change oxygen saturation probe site  4.  Every 4-6 hours:  If on nasal continuous positive airway pressure, respiratory therapy assess nares and determine need for appliance change or resting period.  Outcome: Progressing     Problem: Safety - Adult  Goal: Free from fall injury  5/1/2024 0205 by Esperanza Singh, RN  Outcome: Progressing  Flowsheets (Taken 5/1/2024 0205)  Free From Fall Injury: Instruct family/caregiver on patient safety  4/30/2024 1610 by Carlotta Burris, RN  Outcome: Progressing

## 2024-05-01 NOTE — DISCHARGE INSTRUCTIONS
Follow-up outpatient with PCP and neurology.  Continue medications as prescribed.    Call 911 anytime you think you may need emergency care. For example, call if:    You have signs of another stroke. These may include:  Sudden numbness, tingling, weakness, or loss of movement in your face, arm, or leg, especially on only one side of your body.  Sudden vision changes.  Sudden trouble speaking.  Sudden confusion or trouble understanding simple statements.  Sudden problems with walking or balance.  A sudden, severe headache that is different from past headaches.  Fainting.  A seizure.  Call 911 even if these symptoms go away in a few minutes.   Call your doctor now or seek immediate medical care if:    You have new symptoms that may be related to your stroke, such as falls or trouble swallowing.   Watch and call if:    You have been feeling sad, depressed, or hopeless, or you have lost interest in things that you usually enjoy.     You have anxiety or fear that affects your life.   Watch closely for changes in your health, and be sure to contact your doctor if you have any problems.

## 2024-05-01 NOTE — PROGRESS NOTES
SPEECH LANGUAGE PATHOLOGY  Facility/Department: [address]   CLINICAL BEDSIDE SWALLOW EVALUATION    NAME: Samantha Calero  : 1950  MRN: [id number]    ADMISSION DATE: 2024  ADMITTING DIAGNOSIS: has Gait abnormality; Stenosis of both vertebral arteries; and Primary hypertension on their problem list.    Recent Chest Xray: 2024    IMPRESSION:  No radiographic evidence of an acute cardiopulmonary process.    Date of Eval: 2024  Evaluating Therapist: [facility]    Current Diet level:  Current Diet : Regular  Current Liquid Diet : Thin    Primary Complaint  Samantha Calero is a 73 y.o. female with a past medical history of CAD and HTN who presented to the emergency department on 2024 complaining of slurred speech, left-sided facial numbness and gait disturbance with falls. The patient states that her symptoms began five-days-ago and have remained about the same. The patient reports that the left side of her face in numb and says that she has been falling to the right. In the ED, the patient was afebrile and nontoxic appearing. CT and CTA of the head and neck showed several old strokes as well as vertebral artery stenosis but were negative for acute CVA. The patient is admitted to internal medicine for further management of likely subacute CVA. Neurology has been consulted; appreciate recommendations.     Pain:  Pain Assessment  Pain Assessment: None - Denies Pain  Pain Level: 0    Reason for Referral  Samantha Calero was referred for a bedside swallow evaluation to assess the efficiency of her swallow function, identify signs and symptoms of aspiration and make recommendations regarding safe dietary consistencies, effective compensatory strategies, and safe eating environment.    Impression  Dysphagia Diagnosis: Mild oral stage dysphagia;Mild to moderate pharyngeal stage dysphagia;Suspected needs further assessment  Dysphagia Impression : Pt presents with overt s/s aspiration and

## 2024-05-10 ENCOUNTER — OFFICE VISIT (OUTPATIENT)
Dept: NEUROLOGY | Age: 74
End: 2024-05-10
Payer: MEDICARE

## 2024-05-10 VITALS
BODY MASS INDEX: 33.02 KG/M2 | SYSTOLIC BLOOD PRESSURE: 119 MMHG | HEIGHT: 64 IN | HEART RATE: 75 BPM | WEIGHT: 193.4 LBS | DIASTOLIC BLOOD PRESSURE: 74 MMHG

## 2024-05-10 DIAGNOSIS — R26.9 GAIT ABNORMALITY: Primary | ICD-10-CM

## 2024-05-10 DIAGNOSIS — I65.03 STENOSIS OF BOTH VERTEBRAL ARTERIES: ICD-10-CM

## 2024-05-10 PROCEDURE — 1111F DSCHRG MED/CURRENT MED MERGE: CPT | Performed by: STUDENT IN AN ORGANIZED HEALTH CARE EDUCATION/TRAINING PROGRAM

## 2024-05-10 PROCEDURE — 3074F SYST BP LT 130 MM HG: CPT | Performed by: STUDENT IN AN ORGANIZED HEALTH CARE EDUCATION/TRAINING PROGRAM

## 2024-05-10 PROCEDURE — 1036F TOBACCO NON-USER: CPT | Performed by: STUDENT IN AN ORGANIZED HEALTH CARE EDUCATION/TRAINING PROGRAM

## 2024-05-10 PROCEDURE — 1090F PRES/ABSN URINE INCON ASSESS: CPT | Performed by: STUDENT IN AN ORGANIZED HEALTH CARE EDUCATION/TRAINING PROGRAM

## 2024-05-10 PROCEDURE — 3078F DIAST BP <80 MM HG: CPT | Performed by: STUDENT IN AN ORGANIZED HEALTH CARE EDUCATION/TRAINING PROGRAM

## 2024-05-10 PROCEDURE — G8427 DOCREV CUR MEDS BY ELIG CLIN: HCPCS | Performed by: STUDENT IN AN ORGANIZED HEALTH CARE EDUCATION/TRAINING PROGRAM

## 2024-05-10 PROCEDURE — G8400 PT W/DXA NO RESULTS DOC: HCPCS | Performed by: STUDENT IN AN ORGANIZED HEALTH CARE EDUCATION/TRAINING PROGRAM

## 2024-05-10 PROCEDURE — 99215 OFFICE O/P EST HI 40 MIN: CPT | Performed by: STUDENT IN AN ORGANIZED HEALTH CARE EDUCATION/TRAINING PROGRAM

## 2024-05-10 PROCEDURE — 1123F ACP DISCUSS/DSCN MKR DOCD: CPT | Performed by: STUDENT IN AN ORGANIZED HEALTH CARE EDUCATION/TRAINING PROGRAM

## 2024-05-10 PROCEDURE — 3017F COLORECTAL CA SCREEN DOC REV: CPT | Performed by: STUDENT IN AN ORGANIZED HEALTH CARE EDUCATION/TRAINING PROGRAM

## 2024-05-10 PROCEDURE — G8417 CALC BMI ABV UP PARAM F/U: HCPCS | Performed by: STUDENT IN AN ORGANIZED HEALTH CARE EDUCATION/TRAINING PROGRAM

## 2024-05-10 RX ORDER — CETIRIZINE HYDROCHLORIDE 10 MG/1
10 TABLET ORAL DAILY
COMMUNITY

## 2024-05-10 RX ORDER — LANOLIN ALCOHOL/MO/W.PET/CERES
6 CREAM (GRAM) TOPICAL NIGHTLY
COMMUNITY

## 2024-05-10 RX ORDER — POLYETHYLENE GLYCOL 3350 17 G/17G
17 POWDER, FOR SOLUTION ORAL DAILY PRN
COMMUNITY
End: 2024-05-24

## 2024-05-10 RX ORDER — ENOXAPARIN SODIUM 300 MG/3ML
INJECTION INTRAVENOUS; SUBCUTANEOUS DAILY
COMMUNITY
End: 2024-05-24

## 2024-05-10 RX ORDER — DOCUSATE SODIUM 100 MG/1
100 CAPSULE, LIQUID FILLED ORAL 2 TIMES DAILY
COMMUNITY
End: 2024-05-24

## 2024-05-10 RX ORDER — SENNOSIDES A AND B 8.6 MG/1
2 TABLET, FILM COATED ORAL DAILY PRN
COMMUNITY
End: 2024-05-24

## 2024-05-10 RX ORDER — GUAIFENESIN 600 MG/1
600 TABLET, EXTENDED RELEASE ORAL 2 TIMES DAILY PRN
COMMUNITY

## 2024-05-10 RX ORDER — OXYMETAZOLINE HYDROCHLORIDE 0.05 G/100ML
1 SPRAY NASAL 2 TIMES DAILY
COMMUNITY
End: 2024-05-24

## 2024-05-10 RX ORDER — BISACODYL 10 MG
10 SUPPOSITORY, RECTAL RECTAL DAILY PRN
COMMUNITY
End: 2024-05-24

## 2024-05-10 RX ORDER — ACETAMINOPHEN 325 MG/1
650 TABLET ORAL EVERY 4 HOURS PRN
COMMUNITY

## 2024-05-11 NOTE — PROGRESS NOTES
Motor right arm:  0 - no drift, limb holds 90 (or 45) degrees for full 10 seconds  6a.  Motor left le - no drift; leg holds 30 degree position for full 5 seconds  6b.  Motor right le - no drift; leg holds 30 degree position for full 5 seconds  7.    Limb Ataxia:  0 - absent  8.    Sensory:  0 - normal; no sensory loss  9.    Best Language:  0 - no aphasia, normal  10.  Dysarthria:  1 - mild to moderate, patient slurs at least some words and at worst, can be understood with some difficulty  11.  Extinction and Inattention:  0 - no abnormality    NIHSS 1  MRS: 3      LABS:   No results for input(s): \"WBC\", \"HGB\", \"HCT\", \"PLT\", \"NA\", \"K\", \"CL\", \"CO2\", \"BUN\", \"CREATININE\", \"MG\", \"PHOS\", \"CALCIUM\", \"INR\", \"AST\", \"ALT\", \"BILITOT\", \"BILIDIR\", \"NITRU\", \"COLORU\", \"BACTERIA\" in the last 72 hours.    Invalid input(s): \"PT\", \"PTT\", \"WBCU\", \"RBCU\", \"LEUKOCYTESUA\"  No results for input(s): \"ALKPHOS\", \"ALT\", \"AST\", \"BILITOT\", \"BILIDIR\", \"LABALBU\", \"AMYLASE\", \"LIPASE\" in the last 72 hours.    RADIOLOGY:   Images were personally reviewed including:  CT brain without contrast: chornic left cerebellar, right occipital and pontine strokes  CTA imaging: bilateral cervical 40% and bilateral right distal ica and cavernous left ica intracranial stenoses.  Basilar stenosis with bilaterally functionally fetal pca           IMPRESSIONS:     73 y.o. female who presents with history of cad, pacemaker and htn with symptoms of  dysarthria and gait imbalance.     Ct and cta concerning for chronic left cerebellar and right occipital and pontine stroke.  bilateral functionally fetal pca and noted diminutive basilar artery with stenosis.  Also noted right distal ica stenosis and left cavernous 70% stenosis.      The patient was started on dual antiplatelet therapy for a total of 3 months ( till ) and high intensity statin.     She presented today from her rehab center with improved symptoms.  Discussed at length Tawanna

## 2024-05-20 NOTE — PROGRESS NOTES
Physician Progress Note      PATIENT:               GRACIA JOSEPH  [facility] #:                  [id number]  :                       1950  ADMIT DATE:       2024 12:18 PM  DISCH DATE:        2024 4:03 PM  RESPONDING  PROVIDER #:        Noel Elizondo MD          QUERY TEXT:    Pt admitted with stroke like symptoms. Pt noted to have 5 day symptoms of   left-sided facial numbness and gait disturbance. She is also experiencing   dizziness and speech difficulty. NIHSS is 2. BP ranges from 126/76 to 174/90.   Neurology documents: Patient with radiographic evidence of chronic right   cerebellar and pontine ischemic stroke. If possible, please document in   progress notes and discharge summary the relationship, if any, between stroke   like symptoms and the following:    The medical record reflects the following:  Risk Factors: CAD, Hx of CVA  Clinical Indicators: 5 day symptoms of left-sided facial numbness and gait   disturbance. She is also experiencing dizziness and speech difficulty. NIHSS   is 2. BP ranges from 126/76 to 174/90. Neurology documents: Patient with   radiographic evidence of chronic right cerebellar and pontine ischemic stroke.   Also, 5 days history of gait imbalance and ataxia, possible cerebellar   stroke.  Treatment: Neuro Consult, CT/CTA Head, 2D Echo, on discharge start aspirin 81   mg QD, Plavix 75 mg QD along with Lipitor 40 mg PO QHS. PT/OT and rehab.    Thank you for your time, Lou RICHARDS, RN CDS. If you have questions, please   call [phone] (M-F 7a-3p).  Options provided:  -- Stroke like symptoms likely due to acute/subacute Cerebellar stroke  -- Stroke like symptoms due to severe stenosis in the right basilar artery  -- Stroke like symptoms due to left cavernous 70% ICA stenosis  -- Stroke like symptoms due to medication noncompliance  -- Stroke like symptoms sequela to previous CVA  -- Stroke like symptoms due to hypertension  -- Stroke like symptoms due to, please

## 2024-05-23 ENCOUNTER — INITIAL CONSULT (OUTPATIENT)
Dept: ONCOLOGY | Age: 74
End: 2024-05-23
Payer: MEDICARE

## 2024-05-23 VITALS
SYSTOLIC BLOOD PRESSURE: 129 MMHG | RESPIRATION RATE: 18 BRPM | OXYGEN SATURATION: 96 % | HEART RATE: 66 BPM | HEIGHT: 64 IN | DIASTOLIC BLOOD PRESSURE: 85 MMHG | BODY MASS INDEX: 32.78 KG/M2 | TEMPERATURE: 97 F | WEIGHT: 192 LBS

## 2024-05-23 DIAGNOSIS — I65.03 STENOSIS OF BOTH VERTEBRAL ARTERIES: ICD-10-CM

## 2024-05-23 DIAGNOSIS — I10 PRIMARY HYPERTENSION: ICD-10-CM

## 2024-05-23 DIAGNOSIS — R26.9 GAIT ABNORMALITY: Primary | ICD-10-CM

## 2024-05-23 PROCEDURE — 3074F SYST BP LT 130 MM HG: CPT | Performed by: INTERNAL MEDICINE

## 2024-05-23 PROCEDURE — 1090F PRES/ABSN URINE INCON ASSESS: CPT | Performed by: INTERNAL MEDICINE

## 2024-05-23 PROCEDURE — G8417 CALC BMI ABV UP PARAM F/U: HCPCS | Performed by: INTERNAL MEDICINE

## 2024-05-23 PROCEDURE — 99203 OFFICE O/P NEW LOW 30 MIN: CPT | Performed by: INTERNAL MEDICINE

## 2024-05-23 PROCEDURE — G8427 DOCREV CUR MEDS BY ELIG CLIN: HCPCS | Performed by: INTERNAL MEDICINE

## 2024-05-23 PROCEDURE — 3079F DIAST BP 80-89 MM HG: CPT | Performed by: INTERNAL MEDICINE

## 2024-05-23 NOTE — PROGRESS NOTES
IMPRESSION:   Acute ischemic stroke  Hypertension and coronary artery disease  PLAN:   Agree with dual antiplatelet agent  In absence of atrial fibrillation, no need for anticoagulation  The patient will continue to follow with endovascular.  Will be available as needed    Bello Walden MD  Hematologist/Medical Oncologist  [facility]